# Patient Record
Sex: FEMALE | Race: WHITE | Employment: OTHER | URBAN - NONMETROPOLITAN AREA
[De-identification: names, ages, dates, MRNs, and addresses within clinical notes are randomized per-mention and may not be internally consistent; named-entity substitution may affect disease eponyms.]

---

## 2017-03-29 ENCOUNTER — HOSPITAL ENCOUNTER (OUTPATIENT)
Dept: NON INVASIVE DIAGNOSTICS | Age: 82
Discharge: HOME | End: 2017-03-29
Payer: MEDICARE

## 2017-03-29 PROCEDURE — 93306 TTE W/DOPPLER COMPLETE: CPT

## 2017-12-28 RX ORDER — ALENDRONATE SODIUM 70 MG/1
70 TABLET ORAL
COMMUNITY

## 2017-12-28 RX ORDER — ESOMEPRAZOLE MAGNESIUM 40 MG/1
40 CAPSULE, DELAYED RELEASE ORAL
COMMUNITY

## 2017-12-28 RX ORDER — FUROSEMIDE 20 MG/1
20 TABLET ORAL 2 TIMES DAILY
COMMUNITY

## 2017-12-28 RX ORDER — OXYCODONE HYDROCHLORIDE AND ACETAMINOPHEN 5; 325 MG/1; MG/1
1 TABLET ORAL EVERY 6 HOURS PRN
COMMUNITY

## 2017-12-28 RX ORDER — SUCRALFATE 1 G/1
1 TABLET ORAL 4 TIMES DAILY
COMMUNITY

## 2017-12-28 RX ORDER — BUSPIRONE HYDROCHLORIDE 15 MG/1
15 TABLET ORAL 3 TIMES DAILY
COMMUNITY

## 2017-12-28 RX ORDER — PREDNISONE 1 MG/1
5 TABLET ORAL DAILY
COMMUNITY

## 2017-12-28 RX ORDER — HYDROCHLOROTHIAZIDE 25 MG/1
25 TABLET ORAL DAILY
COMMUNITY

## 2017-12-28 RX ORDER — PENTOXIFYLLINE 400 MG/1
400 TABLET, EXTENDED RELEASE ORAL
COMMUNITY

## 2017-12-28 RX ORDER — LISINOPRIL 5 MG/1
5 TABLET ORAL DAILY
COMMUNITY

## 2017-12-28 RX ORDER — ALBUTEROL SULFATE 90 UG/1
2 AEROSOL, METERED RESPIRATORY (INHALATION) EVERY 4 HOURS PRN
COMMUNITY

## 2018-01-11 ENCOUNTER — OFFICE VISIT (OUTPATIENT)
Dept: GASTROENTEROLOGY | Facility: CLINIC | Age: 83
End: 2018-01-11

## 2018-01-11 VITALS
HEIGHT: 67 IN | TEMPERATURE: 97.2 F | OXYGEN SATURATION: 100 % | SYSTOLIC BLOOD PRESSURE: 124 MMHG | DIASTOLIC BLOOD PRESSURE: 70 MMHG | WEIGHT: 135 LBS | HEART RATE: 67 BPM | BODY MASS INDEX: 21.19 KG/M2

## 2018-01-11 DIAGNOSIS — R13.19 OTHER DYSPHAGIA: Primary | ICD-10-CM

## 2018-01-11 PROCEDURE — 99204 OFFICE O/P NEW MOD 45 MIN: CPT | Performed by: NURSE PRACTITIONER

## 2018-01-11 RX ORDER — FOLIC ACID 1 MG/1
1 TABLET ORAL DAILY
COMMUNITY

## 2018-01-11 RX ORDER — LANOLIN ALCOHOL/MO/W.PET/CERES
1000 CREAM (GRAM) TOPICAL DAILY
COMMUNITY

## 2018-01-11 RX ORDER — METHOCARBAMOL 750 MG/1
750 TABLET, FILM COATED ORAL 4 TIMES DAILY PRN
COMMUNITY

## 2018-01-11 NOTE — PROGRESS NOTES
Chief Complaint   Patient presents with   • Difficulty Swallowing     having problems swallowing off and on meats are the worse       Subjective     HPI     Difficulty swallowing solids and liquids intermittently x 1 yr.  Sx felt in mid esophagus.  Time will relieve sx, no regurgitation.  No N/V.  Increase in indigestion.  She has taken Nexium x 5 yrs and does not think this medication is helping any longer.  She has taken prilosec in the past.  No abdominal pain, no rectal bleeding or melena stools.  Weight is stable.    Past Medical History:   Diagnosis Date   • Hypertension        Past Surgical History:   Procedure Laterality Date   • COLECTOMY TOTAL     • REPLACEMENT TOTAL KNEE BILATERAL      bilateral       Outpatient Prescriptions Marked as Taking for the 1/11/18 encounter (Office Visit) with GALLO Ho   Medication Sig Dispense Refill   • albuterol (PROVENTIL HFA;VENTOLIN HFA) 108 (90 Base) MCG/ACT inhaler Inhale 2 puffs Every 4 (Four) Hours As Needed for Wheezing.     • busPIRone (BUSPAR) 15 MG tablet Take 15 mg by mouth 3 (Three) Times a Day.     • diclofenac (VOLTAREN) 1 % gel gel Apply 4 g topically 4 (Four) Times a Day As Needed.     • esomeprazole (nexIUM) 40 MG capsule Take 40 mg by mouth Every Morning Before Breakfast.     • folic acid (FOLVITE) 1 MG tablet Take 1 mg by mouth Daily.     • furosemide (LASIX) 20 MG tablet Take 20 mg by mouth 2 (Two) Times a Day.     • lisinopril (PRINIVIL,ZESTRIL) 5 MG tablet Take 5 mg by mouth Daily.     • methocarbamol (ROBAXIN) 750 MG tablet Take 750 mg by mouth 4 (Four) Times a Day As Needed for Muscle Spasms.     • oxyCODONE-acetaminophen (PERCOCET) 5-325 MG per tablet Take 1 tablet by mouth Every 6 (Six) Hours As Needed.     • pentoxifylline (TRENtal) 400 MG CR tablet Take 400 mg by mouth 3 (Three) Times a Day With Meals.     • predniSONE (DELTASONE) 5 MG tablet Take 5 mg by mouth Daily.     • sucralfate (CARAFATE) 1 g tablet Take 1 g by mouth 4  "(Four) Times a Day.     • vitamin B-12 (CYANOCOBALAMIN) 1000 MCG tablet Take 1,000 mcg by mouth Daily.         Allergies   Allergen Reactions   • Celebrex [Celecoxib]    • Dilantin [Phenytoin Sodium Extended]    • Erythromycin    • Floxin [Ofloxacin]    • Penicillins        Social History     Social History   • Marital status:      Spouse name: N/A   • Number of children: N/A   • Years of education: N/A     Occupational History   • Not on file.     Social History Main Topics   • Smoking status: Former Smoker   • Smokeless tobacco: Never Used   • Alcohol use No   • Drug use: No   • Sexual activity: Not on file     Other Topics Concern   • Not on file     Social History Narrative       Family History   Problem Relation Age of Onset   • Colon cancer Neg Hx    • Esophageal cancer Neg Hx        Review of Systems   Constitutional: Negative for fatigue, fever and unexpected weight change.   HENT: Negative for hearing loss, sore throat and voice change.    Eyes: Negative for visual disturbance.   Respiratory: Negative for cough, shortness of breath and wheezing.    Cardiovascular: Negative for chest pain and palpitations.   Gastrointestinal: Negative for abdominal pain, blood in stool and vomiting.   Endocrine: Negative for polydipsia and polyuria.   Genitourinary: Negative for difficulty urinating, dysuria, hematuria and urgency.   Musculoskeletal: Negative for joint swelling and myalgias.   Skin: Negative for color change, rash and wound.   Neurological: Negative for dizziness, tremors, seizures and syncope.   Hematological: Does not bruise/bleed easily.   Psychiatric/Behavioral: Negative for agitation and confusion. The patient is not nervous/anxious.        Objective     Vitals:    01/11/18 1009   BP: 124/70   Pulse: 67   Temp: 97.2 °F (36.2 °C)   SpO2: 100%   Weight: 61.2 kg (135 lb)   Height: 170.2 cm (67\")     Body mass index is 21.14 kg/(m^2).    Physical Exam   Constitutional: She is oriented to person, " place, and time. She appears well-developed and well-nourished.   HENT:   Head: Normocephalic and atraumatic.   Eyes: Conjunctivae are normal. Pupils are equal, round, and reactive to light. No scleral icterus.   Neck: No JVD present. No thyroid mass and no thyromegaly present.   Cardiovascular: Normal rate, regular rhythm and normal heart sounds.  Exam reveals no gallop and no friction rub.    No murmur heard.  Pulmonary/Chest: Effort normal and breath sounds normal. No accessory muscle usage. No respiratory distress. She has no wheezes. She has no rales.   Abdominal: Soft. Bowel sounds are normal. She exhibits no distension, no ascites and no mass. There is no splenomegaly or hepatomegaly. There is no tenderness. There is no rebound and no guarding.   Genitourinary:   Genitourinary Comments: Rectal-Did not examine   Musculoskeletal: Normal range of motion. She exhibits no edema.   Neurological: She is alert and oriented to person, place, and time.   Deemed a reliable historian, able to converse without difficulty and able to move all extremities without difficulty   Skin: Skin is warm and dry.   Psychiatric: She has a normal mood and affect. Her behavior is normal.       Imaging Results (most recent)     None          Assessment/Plan     Cassandra was seen today for difficulty swallowing.    Diagnoses and all orders for this visit:    Other dysphagia  -     FL Esophagram Complete    Further orders pending result of esophagram  Pt would like to avoid EGD if possible  EGD v medication change  * Surgery not found *    There are no Patient Instructions on file for this visit.

## 2018-01-19 ENCOUNTER — HOSPITAL ENCOUNTER (OUTPATIENT)
Dept: GENERAL RADIOLOGY | Facility: HOSPITAL | Age: 83
Discharge: HOME | End: 2018-01-19
Admitting: NURSE PRACTITIONER

## 2018-01-19 PROCEDURE — 74220 X-RAY XM ESOPHAGUS 1CNTRST: CPT

## 2018-01-19 RX ADMIN — BARIUM SULFATE 60 ML: 960 POWDER, FOR SUSPENSION ORAL at 09:56

## 2018-01-22 ENCOUNTER — TELEPHONE (OUTPATIENT)
Dept: GASTROENTEROLOGY | Facility: CLINIC | Age: 83
End: 2018-01-22

## 2018-01-22 NOTE — TELEPHONE ENCOUNTER
Esophagram reviewed  Unable to reach pt by phone, not able to leave message  Test discussed with Dr Edward  Will recommend trial PPI

## 2018-02-06 ENCOUNTER — TELEPHONE (OUTPATIENT)
Dept: GASTROENTEROLOGY | Facility: CLINIC | Age: 83
End: 2018-02-06

## 2018-05-09 ENCOUNTER — HOSPITAL ENCOUNTER (OUTPATIENT)
Dept: NON INVASIVE DIAGNOSTICS | Age: 83
Discharge: HOME | End: 2018-05-09
Payer: MEDICARE

## 2018-05-09 LAB
LV EF: 60 %
LVEF MODALITY: NORMAL

## 2018-05-09 PROCEDURE — 93306 TTE W/DOPPLER COMPLETE: CPT

## 2018-07-19 LAB
BACTERIA: ABNORMAL /HPF
BILIRUBIN URINE: NEGATIVE
BLOOD, URINE: NEGATIVE
CLARITY: CLEAR
COLOR: YELLOW
EPITHELIAL CELLS, UA: 0 /HPF (ref 0–5)
GLUCOSE URINE: NEGATIVE MG/DL
HYALINE CASTS: 3 /HPF (ref 0–8)
KETONES, URINE: NEGATIVE MG/DL
LEUKOCYTE ESTERASE, URINE: ABNORMAL
NITRITE, URINE: NEGATIVE
PH UA: 5.5
PROTEIN UA: NEGATIVE MG/DL
RBC UA: 1 /HPF (ref 0–4)
SPECIFIC GRAVITY UA: 1.01
URINE REFLEX TO CULTURE: YES
UROBILINOGEN, URINE: 0.2 E.U./DL
WBC UA: 20 /HPF (ref 0–5)

## 2018-07-21 LAB
ORGANISM: ABNORMAL
URINE CULTURE, ROUTINE: ABNORMAL
URINE CULTURE, ROUTINE: ABNORMAL

## 2018-07-23 LAB
ANION GAP SERPL CALCULATED.3IONS-SCNC: 14 MMOL/L (ref 7–19)
BASOPHILS ABSOLUTE: 0 K/UL (ref 0–0.2)
BASOPHILS RELATIVE PERCENT: 0.1 % (ref 0–1)
BUN BLDV-MCNC: 34 MG/DL (ref 8–23)
CALCIUM SERPL-MCNC: 9 MG/DL (ref 8.2–9.6)
CHLORIDE BLD-SCNC: 106 MMOL/L (ref 98–111)
CO2: 23 MMOL/L (ref 22–29)
CREAT SERPL-MCNC: 1.3 MG/DL (ref 0.5–0.9)
EOSINOPHILS ABSOLUTE: 0 K/UL (ref 0–0.6)
EOSINOPHILS RELATIVE PERCENT: 0.3 % (ref 0–5)
GFR NON-AFRICAN AMERICAN: 38
GLUCOSE BLD-MCNC: 81 MG/DL (ref 74–109)
HCT VFR BLD CALC: 29.2 % (ref 37–47)
HEMOGLOBIN: 9.1 G/DL (ref 12–16)
LYMPHOCYTES ABSOLUTE: 1.1 K/UL (ref 1.1–4.5)
LYMPHOCYTES RELATIVE PERCENT: 10.3 % (ref 20–40)
MCH RBC QN AUTO: 31.1 PG (ref 27–31)
MCHC RBC AUTO-ENTMCNC: 31.2 G/DL (ref 33–37)
MCV RBC AUTO: 99.7 FL (ref 81–99)
MONOCYTES ABSOLUTE: 1.2 K/UL (ref 0–0.9)
MONOCYTES RELATIVE PERCENT: 11.9 % (ref 0–10)
NEUTROPHILS ABSOLUTE: 7.8 K/UL (ref 1.5–7.5)
NEUTROPHILS RELATIVE PERCENT: 76.7 % (ref 50–65)
PDW BLD-RTO: 15.5 % (ref 11.5–14.5)
PLATELET # BLD: 346 K/UL (ref 130–400)
PMV BLD AUTO: 11.1 FL (ref 9.4–12.3)
POTASSIUM SERPL-SCNC: 3.5 MMOL/L (ref 3.5–5)
RBC # BLD: 2.93 M/UL (ref 4.2–5.4)
SODIUM BLD-SCNC: 143 MMOL/L (ref 136–145)
WBC # BLD: 10.2 K/UL (ref 4.8–10.8)

## 2018-08-02 ENCOUNTER — OFFICE VISIT (OUTPATIENT)
Dept: CARDIOLOGY | Age: 83
End: 2018-08-02
Payer: MEDICARE

## 2018-08-02 VITALS
HEART RATE: 94 BPM | WEIGHT: 148 LBS | DIASTOLIC BLOOD PRESSURE: 88 MMHG | HEIGHT: 67 IN | SYSTOLIC BLOOD PRESSURE: 140 MMHG | BODY MASS INDEX: 23.23 KG/M2

## 2018-08-02 DIAGNOSIS — I35.0 NONRHEUMATIC AORTIC VALVE STENOSIS: ICD-10-CM

## 2018-08-02 DIAGNOSIS — I35.0 AORTIC VALVE STENOSIS, ETIOLOGY OF CARDIAC VALVE DISEASE UNSPECIFIED: Primary | ICD-10-CM

## 2018-08-02 DIAGNOSIS — I10 ESSENTIAL HYPERTENSION: ICD-10-CM

## 2018-08-02 PROCEDURE — 1101F PT FALLS ASSESS-DOCD LE1/YR: CPT | Performed by: INTERNAL MEDICINE

## 2018-08-02 PROCEDURE — G8427 DOCREV CUR MEDS BY ELIG CLIN: HCPCS | Performed by: INTERNAL MEDICINE

## 2018-08-02 PROCEDURE — 4040F PNEUMOC VAC/ADMIN/RCVD: CPT | Performed by: INTERNAL MEDICINE

## 2018-08-02 PROCEDURE — 1123F ACP DISCUSS/DSCN MKR DOCD: CPT | Performed by: INTERNAL MEDICINE

## 2018-08-02 PROCEDURE — G8420 CALC BMI NORM PARAMETERS: HCPCS | Performed by: INTERNAL MEDICINE

## 2018-08-02 PROCEDURE — 99202 OFFICE O/P NEW SF 15 MIN: CPT | Performed by: INTERNAL MEDICINE

## 2018-08-02 PROCEDURE — 1036F TOBACCO NON-USER: CPT | Performed by: INTERNAL MEDICINE

## 2018-08-02 PROCEDURE — 93000 ELECTROCARDIOGRAM COMPLETE: CPT | Performed by: INTERNAL MEDICINE

## 2018-08-02 PROCEDURE — 1090F PRES/ABSN URINE INCON ASSESS: CPT | Performed by: INTERNAL MEDICINE

## 2018-08-02 RX ORDER — PREDNISONE 1 MG/1
5 TABLET ORAL DAILY PRN
Status: ON HOLD | COMMUNITY
End: 2020-01-01 | Stop reason: HOSPADM

## 2018-08-02 RX ORDER — SENNA PLUS 8.6 MG/1
2 TABLET ORAL DAILY
COMMUNITY
End: 2020-01-01

## 2018-08-02 RX ORDER — HYDROXYZINE HYDROCHLORIDE 10 MG/1
10 TABLET, FILM COATED ORAL 3 TIMES DAILY PRN
Status: ON HOLD | COMMUNITY
End: 2020-01-01 | Stop reason: HOSPADM

## 2018-08-02 RX ORDER — FUROSEMIDE 20 MG/1
20 TABLET ORAL DAILY
Status: ON HOLD | COMMUNITY
End: 2019-01-16 | Stop reason: HOSPADM

## 2018-08-02 RX ORDER — OXYCODONE AND ACETAMINOPHEN 10; 325 MG/1; MG/1
1 TABLET ORAL EVERY 6 HOURS PRN
Status: ON HOLD | COMMUNITY
End: 2020-01-01 | Stop reason: HOSPADM

## 2018-08-02 RX ORDER — BUSPIRONE HYDROCHLORIDE 15 MG/1
15 TABLET ORAL 2 TIMES DAILY
COMMUNITY

## 2018-08-02 RX ORDER — LISINOPRIL 5 MG/1
5 TABLET ORAL DAILY
COMMUNITY
End: 2018-08-02 | Stop reason: DRUGHIGH

## 2018-08-02 RX ORDER — LIDOCAINE 50 MG/G
OINTMENT TOPICAL PRN
COMMUNITY
End: 2020-01-01

## 2018-08-02 RX ORDER — LISINOPRIL 10 MG/1
10 TABLET ORAL DAILY
Qty: 30 TABLET | Refills: 5 | Status: ON HOLD | OUTPATIENT
Start: 2018-08-02 | End: 2019-01-16 | Stop reason: HOSPADM

## 2018-08-02 RX ORDER — GUAIFENESIN 400 MG/1
600 TABLET ORAL 2 TIMES DAILY
COMMUNITY
End: 2020-01-01

## 2018-08-02 ASSESSMENT — ENCOUNTER SYMPTOMS
BACK PAIN: 1
WHEEZING: 0
CHOKING: 0
NAUSEA: 0
SHORTNESS OF BREATH: 1
BLOOD IN STOOL: 0
CHEST TIGHTNESS: 0
ABDOMINAL DISTENTION: 0
COUGH: 0
APNEA: 0

## 2018-08-02 NOTE — PROGRESS NOTES
MRN: [id number] Todays Date: 2018  Last Appointment: Visit date not found  Davi Youssef is a 80 y.o. patient  : 1920  Referring Provider: Anita Zamora MD  PCP: Alli Roy MD    History of Present Illness:   81 y/o lady with numerous medical complaints referred for evaluation because of abnormal ECHO. Study revealed normal LV systolic function with LVH and severe aortic stenosis - aortic valve area 0.7 CM2 by continuity equation. Complaints include fatigue, dyspnea, pain from many sites (arthritis), weak leg that often fails to support her, and multiple bruises. In summary - fairly miserable with the summed problems. No chest pain or syncope. Risk Factors:  Family History   Problem Relation Age of Onset    Other Mother          from diverticulitis    Stroke Father     Cancer Other         siblings     HTN: uncontrolled      Diabetes: No  none    Past Medical History:   Diagnosis Date    GERD (gastroesophageal reflux disease)     Osteoporosis     Rheumatoid arthritis(714.0)     Swelling     bilateral lower extremity     Allergies   Allergen Reactions    Penicillins     Sulfa Antibiotics      Past Surgical History:   Procedure Laterality Date    BRAIN ANEURYSM SURGERY      COLECTOMY      EYE SURGERY      bilateral    OTHER SURGICAL HISTORY      eye injections per Dr. Ben Santos, [facility]    SPLENECTOMY      TOTAL KNEE ARTHROPLASTY      bilatera       Family History   Problem Relation Age of Onset    Other Mother          from diverticulitis    Stroke Father     Cancer Other         siblings       Social History     Social History    Marital status:       Spouse name: N/A    Number of children: N/A    Years of education: N/A     Social History Main Topics    Smoking status: Former Smoker    Smokeless tobacco: Never Used    Alcohol use No    Drug use: No    Sexual activity: Not Asked     Other Topics Concern    None     Social History Narrative    None every 6 hours as needed. , Disp: , Rfl:     Review of Systems   Constitutional: Positive for activity change (leg weakness and arthritis prevent activity) and fatigue. Negative for diaphoresis and unexpected weight change. Poor quality of life due to pain and weakness   HENT: Positive for hearing loss. Negative for congestion and tinnitus. Eyes: Negative for visual disturbance. Respiratory: Positive for shortness of breath. Negative for apnea, cough, choking, chest tightness and wheezing. Cardiovascular: Positive for leg swelling. Negative for chest pain and palpitations. ECHO - Severe AS  VERNON 0.7 CM2   Gastrointestinal: Negative for abdominal distention, blood in stool and nausea. Endocrine: Negative for cold intolerance and heat intolerance. Genitourinary: Negative for difficulty urinating. Musculoskeletal: Positive for back pain. Negative for arthralgias and myalgias. Severe arthritis - constant pain  Leg weakness - can hardly walk   Skin: Negative for rash. Neurological: Positive for dizziness, weakness and numbness. Negative for tremors, seizures, syncope, speech difficulty, light-headedness and headaches. Hematological: Bruises/bleeds easily. Psychiatric/Behavioral: Negative for agitation, confusion, dysphoric mood and sleep disturbance. The patient is not nervous/anxious. BP (!) 140/88   Pulse 94   Ht 5' 7\" (1.702 m)   Wt 148 lb (67.1 kg)   BMI 23.18 kg/m²   Physical Exam   Constitutional: She is oriented to person, place, and time. No distress. Frail elderly lady who had much difficulty standing   HENT:   Head: Normocephalic. Bitemporal wasting   Eyes: Conjunctivae and EOM are normal. Pupils are equal, round, and reactive to light. Neck: No JVD present. Carotid bruit is not present. No thyromegaly present. Cardiovascular: PMI is not displaced. Exam reveals no gallop and no friction rub. No murmur heard.   Irregular rhythm  Coarse 3/6 systolic

## 2018-08-02 NOTE — PATIENT INSTRUCTIONS
Learning About Transcatheter Aortic Valve Replacement (TAVR)  What is TAVR? Transcatheter aortic valve replacement (TAVR) is a procedure that replaces the aortic heart valve. It is done to treat aortic valve stenosis. In aortic valve stenosis, the valve between your heart and the large blood vessel that carries blood to the body (aorta) has narrowed. That forces the heart to pump harder to get enough blood through the valve. In TAVR, the doctor uses a catheter to put in the new heart valve. Open-heart surgery is not done. TAVR is a newer procedure. How well it works long-term is not known yet. And TAVR can cause serious problems. These include stroke, a heart attack during the procedure, or even death. TAVR may be a good option for a person who cannot have surgery or for a person who has a high risk of serious problems from open-heart surgery. For example, you might think about TAVR if you are not healthy enough for an open-heart surgery. TAVR may not be a good choice if an open-heart surgery is likely to be successful. A team of doctors will use professional guidelines to decide whether or not TAVR is a good choice for you. How is TAVR done? TAVR is often done through an incision (cut) in the groin. But sometimes a small cut is made in the chest. The doctor uses a tube called a catheter and special tools that fit inside the catheter. The doctor puts the catheter into a blood vessel and moves it through the blood vessel and into the heart. A specially designed artificial valve fits inside the catheter. The doctor then moves the new valve into the damaged aortic valve. The artificial valve expands and takes the place of the damaged aortic valve. Most people will be asleep for the procedure. The surgery usually takes about 2 to 3 hours. You will have to stay in the hospital for several days after the procedure. What can you expect after TAVR?   · While you are in the hospital, your doctors and nurses will monitor you to check how the new valve is working. · You will receive information from the hospital about diet, activities, and medicine. · You will need to have regular checkups with your doctor. · When you leave the hospital, your doctor may give you a blood thinner for a few months to prevent blood clots. If you get a blood thinner, be sure you get instructions about how to take your medicine safely. Blood thinners can cause serious bleeding problems. Follow-up care is a key part of your treatment and safety. Be sure to make and go to all appointments, and call your doctor if you are having problems. It's also a good idea to know your test results and keep a list of the medicines you take.

## 2018-09-20 ENCOUNTER — OFFICE VISIT (OUTPATIENT)
Dept: CARDIOLOGY | Age: 83
End: 2018-09-20
Payer: MEDICARE

## 2018-09-20 VITALS
HEART RATE: 87 BPM | DIASTOLIC BLOOD PRESSURE: 72 MMHG | BODY MASS INDEX: 22.91 KG/M2 | HEIGHT: 67 IN | WEIGHT: 146 LBS | SYSTOLIC BLOOD PRESSURE: 124 MMHG

## 2018-09-20 DIAGNOSIS — I10 ESSENTIAL HYPERTENSION: Primary | ICD-10-CM

## 2018-09-20 PROCEDURE — G8427 DOCREV CUR MEDS BY ELIG CLIN: HCPCS | Performed by: INTERNAL MEDICINE

## 2018-09-20 PROCEDURE — 1090F PRES/ABSN URINE INCON ASSESS: CPT | Performed by: INTERNAL MEDICINE

## 2018-09-20 PROCEDURE — 4040F PNEUMOC VAC/ADMIN/RCVD: CPT | Performed by: INTERNAL MEDICINE

## 2018-09-20 PROCEDURE — 99212 OFFICE O/P EST SF 10 MIN: CPT | Performed by: INTERNAL MEDICINE

## 2018-09-20 PROCEDURE — 1123F ACP DISCUSS/DSCN MKR DOCD: CPT | Performed by: INTERNAL MEDICINE

## 2018-09-20 PROCEDURE — G8420 CALC BMI NORM PARAMETERS: HCPCS | Performed by: INTERNAL MEDICINE

## 2018-09-20 PROCEDURE — 1036F TOBACCO NON-USER: CPT | Performed by: INTERNAL MEDICINE

## 2018-09-20 PROCEDURE — 1101F PT FALLS ASSESS-DOCD LE1/YR: CPT | Performed by: INTERNAL MEDICINE

## 2019-01-01 ENCOUNTER — TELEPHONE (OUTPATIENT)
Dept: CARDIOLOGY | Age: 84
End: 2019-01-01

## 2019-01-01 ENCOUNTER — OFFICE VISIT (OUTPATIENT)
Dept: CARDIOLOGY | Age: 84
End: 2019-01-01
Payer: MEDICARE

## 2019-01-01 VITALS
BODY MASS INDEX: 20.4 KG/M2 | SYSTOLIC BLOOD PRESSURE: 160 MMHG | WEIGHT: 130 LBS | HEIGHT: 67 IN | DIASTOLIC BLOOD PRESSURE: 80 MMHG | HEART RATE: 70 BPM

## 2019-01-01 DIAGNOSIS — I10 ESSENTIAL HYPERTENSION: Primary | ICD-10-CM

## 2019-01-01 PROCEDURE — 93000 ELECTROCARDIOGRAM COMPLETE: CPT | Performed by: INTERNAL MEDICINE

## 2019-01-01 PROCEDURE — 1036F TOBACCO NON-USER: CPT | Performed by: INTERNAL MEDICINE

## 2019-01-01 PROCEDURE — G8427 DOCREV CUR MEDS BY ELIG CLIN: HCPCS | Performed by: INTERNAL MEDICINE

## 2019-01-01 PROCEDURE — 99212 OFFICE O/P EST SF 10 MIN: CPT | Performed by: INTERNAL MEDICINE

## 2019-01-01 PROCEDURE — 1090F PRES/ABSN URINE INCON ASSESS: CPT | Performed by: INTERNAL MEDICINE

## 2019-01-01 PROCEDURE — G8420 CALC BMI NORM PARAMETERS: HCPCS | Performed by: INTERNAL MEDICINE

## 2019-01-01 PROCEDURE — 4040F PNEUMOC VAC/ADMIN/RCVD: CPT | Performed by: INTERNAL MEDICINE

## 2019-01-01 PROCEDURE — 1123F ACP DISCUSS/DSCN MKR DOCD: CPT | Performed by: INTERNAL MEDICINE

## 2019-01-01 RX ORDER — CLONIDINE HYDROCHLORIDE 0.1 MG/1
0.1 TABLET ORAL NIGHTLY
Qty: 90 TABLET | Refills: 3 | Status: ON HOLD | OUTPATIENT
Start: 2019-01-01 | End: 2020-01-01 | Stop reason: HOSPADM

## 2019-01-10 ENCOUNTER — HOSPITAL ENCOUNTER (INPATIENT)
Age: 84
LOS: 6 days | Discharge: HOME HEALTH | DRG: 292 | End: 2019-01-16
Attending: FAMILY MEDICINE | Admitting: FAMILY MEDICINE
Payer: MEDICARE

## 2019-01-10 ENCOUNTER — APPOINTMENT (OUTPATIENT)
Dept: GENERAL RADIOLOGY | Age: 84
DRG: 292 | End: 2019-01-10
Attending: FAMILY MEDICINE
Payer: MEDICARE

## 2019-01-10 PROBLEM — E87.70 VOLUME OVERLOAD: Status: ACTIVE | Noted: 2019-01-10

## 2019-01-10 LAB
ALBUMIN SERPL-MCNC: 3.6 G/DL (ref 3.5–5.2)
ALP BLD-CCNC: 53 U/L (ref 35–104)
ALT SERPL-CCNC: <5 U/L (ref 5–33)
ANION GAP SERPL CALCULATED.3IONS-SCNC: 15 MMOL/L (ref 7–19)
AST SERPL-CCNC: 13 U/L (ref 5–32)
BASOPHILS ABSOLUTE: 0 K/UL (ref 0–0.2)
BASOPHILS RELATIVE PERCENT: 0.3 % (ref 0–1)
BILIRUB SERPL-MCNC: 0.4 MG/DL (ref 0.2–1.2)
BUN BLDV-MCNC: 23 MG/DL (ref 8–23)
CALCIUM SERPL-MCNC: 9.8 MG/DL (ref 8.2–9.6)
CHLORIDE BLD-SCNC: 101 MMOL/L (ref 98–111)
CO2: 26 MMOL/L (ref 22–29)
CREAT SERPL-MCNC: 0.8 MG/DL (ref 0.5–0.9)
EOSINOPHILS ABSOLUTE: 0 K/UL (ref 0–0.6)
EOSINOPHILS RELATIVE PERCENT: 0 % (ref 0–5)
GFR NON-AFRICAN AMERICAN: >60
GLUCOSE BLD-MCNC: 104 MG/DL (ref 74–109)
HCT VFR BLD CALC: 29.8 % (ref 37–47)
HEMOGLOBIN: 9.4 G/DL (ref 12–16)
LYMPHOCYTES ABSOLUTE: 0.4 K/UL (ref 1.1–4.5)
LYMPHOCYTES RELATIVE PERCENT: 10.5 % (ref 20–40)
MCH RBC QN AUTO: 30.8 PG (ref 27–31)
MCHC RBC AUTO-ENTMCNC: 31.5 G/DL (ref 33–37)
MCV RBC AUTO: 97.7 FL (ref 81–99)
MONOCYTES ABSOLUTE: 0.3 K/UL (ref 0–0.9)
MONOCYTES RELATIVE PERCENT: 6.4 % (ref 0–10)
NEUTROPHILS ABSOLUTE: 3.2 K/UL (ref 1.5–7.5)
NEUTROPHILS RELATIVE PERCENT: 82.3 % (ref 50–65)
PDW BLD-RTO: 15.5 % (ref 11.5–14.5)
PLATELET # BLD: 308 K/UL (ref 130–400)
PMV BLD AUTO: 10.1 FL (ref 9.4–12.3)
POTASSIUM SERPL-SCNC: 4.5 MMOL/L (ref 3.5–5)
PRO-BNP: ABNORMAL PG/ML (ref 0–1800)
RBC # BLD: 3.05 M/UL (ref 4.2–5.4)
SODIUM BLD-SCNC: 142 MMOL/L (ref 136–145)
TOTAL PROTEIN: 6.9 G/DL (ref 6.6–8.7)
WBC # BLD: 3.9 K/UL (ref 4.8–10.8)

## 2019-01-10 PROCEDURE — 80053 COMPREHEN METABOLIC PANEL: CPT

## 2019-01-10 PROCEDURE — 2140000000 HC CCU INTERMEDIATE R&B

## 2019-01-10 PROCEDURE — 6360000002 HC RX W HCPCS: Performed by: FAMILY MEDICINE

## 2019-01-10 PROCEDURE — 85025 COMPLETE CBC W/AUTO DIFF WBC: CPT

## 2019-01-10 PROCEDURE — 36415 COLL VENOUS BLD VENIPUNCTURE: CPT

## 2019-01-10 PROCEDURE — 71046 X-RAY EXAM CHEST 2 VIEWS: CPT

## 2019-01-10 PROCEDURE — 6370000000 HC RX 637 (ALT 250 FOR IP): Performed by: FAMILY MEDICINE

## 2019-01-10 PROCEDURE — 83880 ASSAY OF NATRIURETIC PEPTIDE: CPT

## 2019-01-10 RX ORDER — LANOLIN ALCOHOL/MO/W.PET/CERES
800 CREAM (GRAM) TOPICAL DAILY
Status: DISCONTINUED | OUTPATIENT
Start: 2019-01-10 | End: 2019-01-16 | Stop reason: HOSPADM

## 2019-01-10 RX ORDER — HYDROXYZINE HYDROCHLORIDE 10 MG/1
10 TABLET, FILM COATED ORAL 3 TIMES DAILY PRN
Status: DISCONTINUED | OUTPATIENT
Start: 2019-01-10 | End: 2019-01-16 | Stop reason: HOSPADM

## 2019-01-10 RX ORDER — LISINOPRIL 20 MG/1
20 TABLET ORAL DAILY
Status: DISCONTINUED | OUTPATIENT
Start: 2019-01-10 | End: 2019-01-12

## 2019-01-10 RX ORDER — CYCLOBENZAPRINE HCL 10 MG
5 TABLET ORAL 3 TIMES DAILY PRN
Status: DISCONTINUED | OUTPATIENT
Start: 2019-01-10 | End: 2019-01-16 | Stop reason: HOSPADM

## 2019-01-10 RX ORDER — SENNA PLUS 8.6 MG/1
2 TABLET ORAL DAILY
Status: DISCONTINUED | OUTPATIENT
Start: 2019-01-10 | End: 2019-01-16 | Stop reason: HOSPADM

## 2019-01-10 RX ORDER — OXYCODONE AND ACETAMINOPHEN 10; 325 MG/1; MG/1
1 TABLET ORAL EVERY 4 HOURS PRN
Status: DISCONTINUED | OUTPATIENT
Start: 2019-01-10 | End: 2019-01-16 | Stop reason: HOSPADM

## 2019-01-10 RX ORDER — PANTOPRAZOLE SODIUM 40 MG/1
40 TABLET, DELAYED RELEASE ORAL 2 TIMES DAILY
Status: DISCONTINUED | OUTPATIENT
Start: 2019-01-10 | End: 2019-01-16 | Stop reason: HOSPADM

## 2019-01-10 RX ORDER — ASPIRIN 81 MG/1
81 TABLET, CHEWABLE ORAL DAILY
Status: DISCONTINUED | OUTPATIENT
Start: 2019-01-10 | End: 2019-01-16 | Stop reason: HOSPADM

## 2019-01-10 RX ORDER — CHOLECALCIFEROL (VITAMIN D3) 125 MCG
5000 CAPSULE ORAL DAILY
Status: DISCONTINUED | OUTPATIENT
Start: 2019-01-11 | End: 2019-01-16 | Stop reason: HOSPADM

## 2019-01-10 RX ORDER — FUROSEMIDE 10 MG/ML
40 INJECTION INTRAMUSCULAR; INTRAVENOUS 2 TIMES DAILY
Status: DISCONTINUED | OUTPATIENT
Start: 2019-01-10 | End: 2019-01-13

## 2019-01-10 RX ORDER — SUCRALFATE 1 G/1
1 TABLET ORAL 4 TIMES DAILY
Status: DISCONTINUED | OUTPATIENT
Start: 2019-01-10 | End: 2019-01-16 | Stop reason: HOSPADM

## 2019-01-10 RX ORDER — PENTOXIFYLLINE 400 MG/1
400 TABLET, EXTENDED RELEASE ORAL
Status: DISCONTINUED | OUTPATIENT
Start: 2019-01-10 | End: 2019-01-16 | Stop reason: HOSPADM

## 2019-01-10 RX ORDER — GUAIFENESIN 200 MG/1
400 TABLET ORAL 4 TIMES DAILY PRN
Status: DISCONTINUED | OUTPATIENT
Start: 2019-01-10 | End: 2019-01-16 | Stop reason: HOSPADM

## 2019-01-10 RX ORDER — BUSPIRONE HYDROCHLORIDE 5 MG/1
15 TABLET ORAL 2 TIMES DAILY
Status: DISCONTINUED | OUTPATIENT
Start: 2019-01-10 | End: 2019-01-16 | Stop reason: HOSPADM

## 2019-01-10 RX ORDER — ALBUTEROL SULFATE 90 UG/1
2 AEROSOL, METERED RESPIRATORY (INHALATION) EVERY 6 HOURS PRN
Status: DISCONTINUED | OUTPATIENT
Start: 2019-01-10 | End: 2019-01-16 | Stop reason: HOSPADM

## 2019-01-10 RX ORDER — CYCLOBENZAPRINE HCL 5 MG
5 TABLET ORAL 3 TIMES DAILY PRN
Status: ON HOLD | COMMUNITY
End: 2020-01-01 | Stop reason: HOSPADM

## 2019-01-10 RX ADMIN — FUROSEMIDE 40 MG: 10 INJECTION, SOLUTION INTRAMUSCULAR; INTRAVENOUS at 21:36

## 2019-01-10 RX ADMIN — SUCRALFATE 1 G: 1 TABLET ORAL at 20:14

## 2019-01-10 RX ADMIN — BUSPIRONE HYDROCHLORIDE 15 MG: 5 TABLET ORAL at 20:13

## 2019-01-10 RX ADMIN — OXYCODONE HYDROCHLORIDE AND ACETAMINOPHEN 1 TABLET: 10; 325 TABLET ORAL at 19:42

## 2019-01-10 RX ADMIN — SENNOSIDES 17.2 MG: 8.6 TABLET, FILM COATED ORAL at 19:35

## 2019-01-10 RX ADMIN — LISINOPRIL 20 MG: 20 TABLET ORAL at 19:36

## 2019-01-10 RX ADMIN — PANTOPRAZOLE SODIUM 40 MG: 40 TABLET, DELAYED RELEASE ORAL at 20:13

## 2019-01-10 RX ADMIN — FOLIC ACID TAB 400 MCG 800 MCG: 400 TAB at 19:36

## 2019-01-10 RX ADMIN — DICLOFENAC 2 G: 10 GEL TOPICAL at 20:12

## 2019-01-10 RX ADMIN — ASPIRIN 81 MG CHEWABLE TABLET 81 MG: 81 TABLET CHEWABLE at 19:36

## 2019-01-10 RX ADMIN — PENTOXIFYLLINE 400 MG: 400 TABLET, EXTENDED RELEASE ORAL at 19:35

## 2019-01-10 ASSESSMENT — PAIN SCALES - GENERAL
PAINLEVEL_OUTOF10: 8
PAINLEVEL_OUTOF10: 0
PAINLEVEL_OUTOF10: 7

## 2019-01-11 PROBLEM — Z51.5 PALLIATIVE CARE PATIENT: Status: ACTIVE | Noted: 2019-01-11

## 2019-01-11 LAB
ANION GAP SERPL CALCULATED.3IONS-SCNC: 10 MMOL/L (ref 7–19)
BUN BLDV-MCNC: 23 MG/DL (ref 8–23)
CALCIUM SERPL-MCNC: 9.5 MG/DL (ref 8.2–9.6)
CHLORIDE BLD-SCNC: 101 MMOL/L (ref 98–111)
CO2: 28 MMOL/L (ref 22–29)
CREAT SERPL-MCNC: 0.8 MG/DL (ref 0.5–0.9)
GFR NON-AFRICAN AMERICAN: >60
GLUCOSE BLD-MCNC: 106 MG/DL (ref 74–109)
POTASSIUM SERPL-SCNC: 4.2 MMOL/L (ref 3.5–5)
SODIUM BLD-SCNC: 139 MMOL/L (ref 136–145)

## 2019-01-11 PROCEDURE — 99222 1ST HOSP IP/OBS MODERATE 55: CPT | Performed by: INTERNAL MEDICINE

## 2019-01-11 PROCEDURE — 6370000000 HC RX 637 (ALT 250 FOR IP): Performed by: FAMILY MEDICINE

## 2019-01-11 PROCEDURE — 2140000000 HC CCU INTERMEDIATE R&B

## 2019-01-11 PROCEDURE — 36415 COLL VENOUS BLD VENIPUNCTURE: CPT

## 2019-01-11 PROCEDURE — 80048 BASIC METABOLIC PNL TOTAL CA: CPT

## 2019-01-11 PROCEDURE — 6360000002 HC RX W HCPCS: Performed by: FAMILY MEDICINE

## 2019-01-11 RX ORDER — CLONIDINE HYDROCHLORIDE 0.1 MG/1
0.1 TABLET ORAL EVERY 4 HOURS PRN
Status: DISCONTINUED | OUTPATIENT
Start: 2019-01-11 | End: 2019-01-16 | Stop reason: HOSPADM

## 2019-01-11 RX ORDER — CLONIDINE HYDROCHLORIDE 0.1 MG/1
0.1 TABLET ORAL 2 TIMES DAILY
Status: DISCONTINUED | OUTPATIENT
Start: 2019-01-11 | End: 2019-01-11

## 2019-01-11 RX ADMIN — SUCRALFATE 1 G: 1 TABLET ORAL at 18:42

## 2019-01-11 RX ADMIN — PENTOXIFYLLINE 400 MG: 400 TABLET, EXTENDED RELEASE ORAL at 08:59

## 2019-01-11 RX ADMIN — FUROSEMIDE 40 MG: 10 INJECTION, SOLUTION INTRAMUSCULAR; INTRAVENOUS at 08:58

## 2019-01-11 RX ADMIN — SUCRALFATE 1 G: 1 TABLET ORAL at 08:59

## 2019-01-11 RX ADMIN — DICLOFENAC 2 G: 10 GEL TOPICAL at 08:57

## 2019-01-11 RX ADMIN — ALBUTEROL SULFATE 2 PUFF: 90 AEROSOL, METERED RESPIRATORY (INHALATION) at 08:23

## 2019-01-11 RX ADMIN — CLONIDINE HYDROCHLORIDE 0.1 MG: 0.1 TABLET ORAL at 03:26

## 2019-01-11 RX ADMIN — OXYCODONE HYDROCHLORIDE AND ACETAMINOPHEN 1 TABLET: 10; 325 TABLET ORAL at 10:56

## 2019-01-11 RX ADMIN — BUSPIRONE HYDROCHLORIDE 15 MG: 5 TABLET ORAL at 08:57

## 2019-01-11 RX ADMIN — OXYCODONE HYDROCHLORIDE AND ACETAMINOPHEN 1 TABLET: 10; 325 TABLET ORAL at 00:08

## 2019-01-11 RX ADMIN — ALBUTEROL SULFATE 2 PUFF: 90 AEROSOL, METERED RESPIRATORY (INHALATION) at 20:13

## 2019-01-11 RX ADMIN — FOLIC ACID TAB 400 MCG 800 MCG: 400 TAB at 08:57

## 2019-01-11 RX ADMIN — ASPIRIN 81 MG CHEWABLE TABLET 81 MG: 81 TABLET CHEWABLE at 18:41

## 2019-01-11 RX ADMIN — DICLOFENAC 2 G: 10 GEL TOPICAL at 20:11

## 2019-01-11 RX ADMIN — SUCRALFATE 1 G: 1 TABLET ORAL at 20:10

## 2019-01-11 RX ADMIN — SENNOSIDES 17.2 MG: 8.6 TABLET, FILM COATED ORAL at 08:59

## 2019-01-11 RX ADMIN — SUCRALFATE 1 G: 1 TABLET ORAL at 12:34

## 2019-01-11 RX ADMIN — PENTOXIFYLLINE 400 MG: 400 TABLET, EXTENDED RELEASE ORAL at 12:34

## 2019-01-11 RX ADMIN — OXYCODONE HYDROCHLORIDE AND ACETAMINOPHEN 1 TABLET: 10; 325 TABLET ORAL at 04:11

## 2019-01-11 RX ADMIN — PENTOXIFYLLINE 400 MG: 400 TABLET, EXTENDED RELEASE ORAL at 18:42

## 2019-01-11 RX ADMIN — CHOLECALCIFEROL CAP 125 MCG (5000 UNIT) 5000 UNITS: 125 CAP at 08:58

## 2019-01-11 RX ADMIN — LISINOPRIL 20 MG: 20 TABLET ORAL at 18:41

## 2019-01-11 RX ADMIN — PANTOPRAZOLE SODIUM 40 MG: 40 TABLET, DELAYED RELEASE ORAL at 20:10

## 2019-01-11 RX ADMIN — FUROSEMIDE 40 MG: 10 INJECTION, SOLUTION INTRAMUSCULAR; INTRAVENOUS at 18:44

## 2019-01-11 RX ADMIN — BUSPIRONE HYDROCHLORIDE 15 MG: 5 TABLET ORAL at 20:10

## 2019-01-11 RX ADMIN — PANTOPRAZOLE SODIUM 40 MG: 40 TABLET, DELAYED RELEASE ORAL at 08:59

## 2019-01-11 RX ADMIN — OXYCODONE HYDROCHLORIDE AND ACETAMINOPHEN 1 TABLET: 10; 325 TABLET ORAL at 18:42

## 2019-01-11 RX ADMIN — ENOXAPARIN SODIUM 40 MG: 40 INJECTION SUBCUTANEOUS at 08:58

## 2019-01-11 ASSESSMENT — PAIN DESCRIPTION - LOCATION
LOCATION: GENERALIZED
LOCATION: GENERALIZED

## 2019-01-11 ASSESSMENT — ENCOUNTER SYMPTOMS
ALLERGIC/IMMUNOLOGIC NEGATIVE: 1
EYES NEGATIVE: 1
GASTROINTESTINAL NEGATIVE: 1
SHORTNESS OF BREATH: 1

## 2019-01-11 ASSESSMENT — PAIN SCALES - GENERAL
PAINLEVEL_OUTOF10: 0
PAINLEVEL_OUTOF10: 0
PAINLEVEL_OUTOF10: 7
PAINLEVEL_OUTOF10: 8
PAINLEVEL_OUTOF10: 7
PAINLEVEL_OUTOF10: 8
PAINLEVEL_OUTOF10: 3
PAINLEVEL_OUTOF10: 0

## 2019-01-11 ASSESSMENT — PAIN DESCRIPTION - PAIN TYPE
TYPE: CHRONIC PAIN
TYPE: CHRONIC PAIN

## 2019-01-12 PROBLEM — I50.33 ACUTE ON CHRONIC DIASTOLIC HEART FAILURE (HCC): Status: ACTIVE | Noted: 2019-01-12

## 2019-01-12 PROBLEM — E87.70 VOLUME OVERLOAD: Status: RESOLVED | Noted: 2019-01-10 | Resolved: 2019-01-12

## 2019-01-12 PROBLEM — I10 HYPERTENSION: Status: ACTIVE | Noted: 2019-01-12

## 2019-01-12 LAB
ANION GAP SERPL CALCULATED.3IONS-SCNC: 12 MMOL/L (ref 7–19)
BUN BLDV-MCNC: 28 MG/DL (ref 8–23)
CALCIUM SERPL-MCNC: 9.6 MG/DL (ref 8.2–9.6)
CHLORIDE BLD-SCNC: 100 MMOL/L (ref 98–111)
CO2: 29 MMOL/L (ref 22–29)
CREAT SERPL-MCNC: 1 MG/DL (ref 0.5–0.9)
GFR NON-AFRICAN AMERICAN: 51
GLUCOSE BLD-MCNC: 97 MG/DL (ref 74–109)
POTASSIUM SERPL-SCNC: 4 MMOL/L (ref 3.5–5)
SODIUM BLD-SCNC: 141 MMOL/L (ref 136–145)

## 2019-01-12 PROCEDURE — 6360000002 HC RX W HCPCS: Performed by: FAMILY MEDICINE

## 2019-01-12 PROCEDURE — 6370000000 HC RX 637 (ALT 250 FOR IP): Performed by: INTERNAL MEDICINE

## 2019-01-12 PROCEDURE — 80048 BASIC METABOLIC PNL TOTAL CA: CPT

## 2019-01-12 PROCEDURE — 2140000000 HC CCU INTERMEDIATE R&B

## 2019-01-12 PROCEDURE — 6370000000 HC RX 637 (ALT 250 FOR IP): Performed by: FAMILY MEDICINE

## 2019-01-12 PROCEDURE — 36415 COLL VENOUS BLD VENIPUNCTURE: CPT

## 2019-01-12 RX ORDER — CARVEDILOL 6.25 MG/1
6.25 TABLET ORAL 2 TIMES DAILY WITH MEALS
Status: DISCONTINUED | OUTPATIENT
Start: 2019-01-12 | End: 2019-01-16 | Stop reason: HOSPADM

## 2019-01-12 RX ORDER — LISINOPRIL 20 MG/1
40 TABLET ORAL DAILY
Status: DISCONTINUED | OUTPATIENT
Start: 2019-01-12 | End: 2019-01-16

## 2019-01-12 RX ADMIN — CARVEDILOL 6.25 MG: 6.25 TABLET, FILM COATED ORAL at 17:14

## 2019-01-12 RX ADMIN — FUROSEMIDE 40 MG: 10 INJECTION, SOLUTION INTRAMUSCULAR; INTRAVENOUS at 09:15

## 2019-01-12 RX ADMIN — FUROSEMIDE 40 MG: 10 INJECTION, SOLUTION INTRAMUSCULAR; INTRAVENOUS at 17:15

## 2019-01-12 RX ADMIN — ENOXAPARIN SODIUM 30 MG: 30 INJECTION SUBCUTANEOUS at 09:14

## 2019-01-12 RX ADMIN — SUCRALFATE 1 G: 1 TABLET ORAL at 17:14

## 2019-01-12 RX ADMIN — LISINOPRIL 40 MG: 20 TABLET ORAL at 17:15

## 2019-01-12 RX ADMIN — SUCRALFATE 1 G: 1 TABLET ORAL at 09:16

## 2019-01-12 RX ADMIN — SUCRALFATE 1 G: 1 TABLET ORAL at 12:24

## 2019-01-12 RX ADMIN — CYANOCOBALAMIN TAB 500 MCG 5000 MCG: 500 TAB at 09:15

## 2019-01-12 RX ADMIN — PANTOPRAZOLE SODIUM 40 MG: 40 TABLET, DELAYED RELEASE ORAL at 20:42

## 2019-01-12 RX ADMIN — FOLIC ACID TAB 400 MCG 800 MCG: 400 TAB at 09:14

## 2019-01-12 RX ADMIN — DICLOFENAC 2 G: 10 GEL TOPICAL at 09:14

## 2019-01-12 RX ADMIN — ASPIRIN 81 MG CHEWABLE TABLET 81 MG: 81 TABLET CHEWABLE at 17:14

## 2019-01-12 RX ADMIN — PENTOXIFYLLINE 400 MG: 400 TABLET, EXTENDED RELEASE ORAL at 12:24

## 2019-01-12 RX ADMIN — OXYCODONE HYDROCHLORIDE AND ACETAMINOPHEN 1 TABLET: 10; 325 TABLET ORAL at 14:10

## 2019-01-12 RX ADMIN — PANTOPRAZOLE SODIUM 40 MG: 40 TABLET, DELAYED RELEASE ORAL at 09:16

## 2019-01-12 RX ADMIN — OXYCODONE HYDROCHLORIDE AND ACETAMINOPHEN 1 TABLET: 10; 325 TABLET ORAL at 06:21

## 2019-01-12 RX ADMIN — SENNOSIDES 17.2 MG: 8.6 TABLET, FILM COATED ORAL at 09:15

## 2019-01-12 RX ADMIN — CYCLOBENZAPRINE HYDROCHLORIDE 5 MG: 10 TABLET, FILM COATED ORAL at 20:51

## 2019-01-12 RX ADMIN — PENTOXIFYLLINE 400 MG: 400 TABLET, EXTENDED RELEASE ORAL at 17:14

## 2019-01-12 RX ADMIN — CLONIDINE HYDROCHLORIDE 0.1 MG: 0.1 TABLET ORAL at 07:07

## 2019-01-12 RX ADMIN — CHOLECALCIFEROL CAP 125 MCG (5000 UNIT) 5000 UNITS: 125 CAP at 09:15

## 2019-01-12 RX ADMIN — HYDROXYZINE HYDROCHLORIDE 10 MG: 10 TABLET ORAL at 23:09

## 2019-01-12 RX ADMIN — SUCRALFATE 1 G: 1 TABLET ORAL at 20:42

## 2019-01-12 RX ADMIN — BUSPIRONE HYDROCHLORIDE 15 MG: 5 TABLET ORAL at 20:42

## 2019-01-12 RX ADMIN — PENTOXIFYLLINE 400 MG: 400 TABLET, EXTENDED RELEASE ORAL at 09:16

## 2019-01-12 RX ADMIN — BUSPIRONE HYDROCHLORIDE 15 MG: 5 TABLET ORAL at 09:15

## 2019-01-12 RX ADMIN — OXYCODONE HYDROCHLORIDE AND ACETAMINOPHEN 1 TABLET: 10; 325 TABLET ORAL at 20:50

## 2019-01-12 ASSESSMENT — PAIN SCALES - GENERAL
PAINLEVEL_OUTOF10: 9
PAINLEVEL_OUTOF10: 7
PAINLEVEL_OUTOF10: 9

## 2019-01-13 LAB
ANION GAP SERPL CALCULATED.3IONS-SCNC: 9 MMOL/L (ref 7–19)
BUN BLDV-MCNC: 31 MG/DL (ref 8–23)
CALCIUM SERPL-MCNC: 9.5 MG/DL (ref 8.2–9.6)
CHLORIDE BLD-SCNC: 96 MMOL/L (ref 98–111)
CO2: 36 MMOL/L (ref 22–29)
CREAT SERPL-MCNC: 1.1 MG/DL (ref 0.5–0.9)
GFR NON-AFRICAN AMERICAN: 46
GLUCOSE BLD-MCNC: 106 MG/DL (ref 74–109)
POTASSIUM SERPL-SCNC: 4 MMOL/L (ref 3.5–5)
SODIUM BLD-SCNC: 141 MMOL/L (ref 136–145)

## 2019-01-13 PROCEDURE — 6370000000 HC RX 637 (ALT 250 FOR IP): Performed by: INTERNAL MEDICINE

## 2019-01-13 PROCEDURE — 36415 COLL VENOUS BLD VENIPUNCTURE: CPT

## 2019-01-13 PROCEDURE — 6360000002 HC RX W HCPCS: Performed by: FAMILY MEDICINE

## 2019-01-13 PROCEDURE — 2140000000 HC CCU INTERMEDIATE R&B

## 2019-01-13 PROCEDURE — 80048 BASIC METABOLIC PNL TOTAL CA: CPT

## 2019-01-13 PROCEDURE — 6370000000 HC RX 637 (ALT 250 FOR IP): Performed by: FAMILY MEDICINE

## 2019-01-13 RX ORDER — FUROSEMIDE 10 MG/ML
40 INJECTION INTRAMUSCULAR; INTRAVENOUS DAILY
Status: DISCONTINUED | OUTPATIENT
Start: 2019-01-14 | End: 2019-01-14

## 2019-01-13 RX ADMIN — SUCRALFATE 1 G: 1 TABLET ORAL at 08:55

## 2019-01-13 RX ADMIN — PENTOXIFYLLINE 400 MG: 400 TABLET, EXTENDED RELEASE ORAL at 16:18

## 2019-01-13 RX ADMIN — ASPIRIN 81 MG CHEWABLE TABLET 81 MG: 81 TABLET CHEWABLE at 16:17

## 2019-01-13 RX ADMIN — SENNOSIDES 17.2 MG: 8.6 TABLET, FILM COATED ORAL at 08:54

## 2019-01-13 RX ADMIN — CHOLECALCIFEROL CAP 125 MCG (5000 UNIT) 5000 UNITS: 125 CAP at 08:55

## 2019-01-13 RX ADMIN — PANTOPRAZOLE SODIUM 40 MG: 40 TABLET, DELAYED RELEASE ORAL at 08:55

## 2019-01-13 RX ADMIN — CARVEDILOL 6.25 MG: 6.25 TABLET, FILM COATED ORAL at 16:17

## 2019-01-13 RX ADMIN — FUROSEMIDE 40 MG: 10 INJECTION, SOLUTION INTRAMUSCULAR; INTRAVENOUS at 08:53

## 2019-01-13 RX ADMIN — DICLOFENAC 2 G: 10 GEL TOPICAL at 08:55

## 2019-01-13 RX ADMIN — OXYCODONE HYDROCHLORIDE AND ACETAMINOPHEN 1 TABLET: 10; 325 TABLET ORAL at 20:14

## 2019-01-13 RX ADMIN — FOLIC ACID TAB 400 MCG 800 MCG: 400 TAB at 08:55

## 2019-01-13 RX ADMIN — SUCRALFATE 1 G: 1 TABLET ORAL at 16:17

## 2019-01-13 RX ADMIN — DICLOFENAC 2 G: 10 GEL TOPICAL at 20:10

## 2019-01-13 RX ADMIN — CARVEDILOL 6.25 MG: 6.25 TABLET, FILM COATED ORAL at 08:55

## 2019-01-13 RX ADMIN — BUSPIRONE HYDROCHLORIDE 15 MG: 5 TABLET ORAL at 19:52

## 2019-01-13 RX ADMIN — OXYCODONE HYDROCHLORIDE AND ACETAMINOPHEN 1 TABLET: 10; 325 TABLET ORAL at 16:18

## 2019-01-13 RX ADMIN — SUCRALFATE 1 G: 1 TABLET ORAL at 11:38

## 2019-01-13 RX ADMIN — LISINOPRIL 40 MG: 20 TABLET ORAL at 16:18

## 2019-01-13 RX ADMIN — SUCRALFATE 1 G: 1 TABLET ORAL at 19:51

## 2019-01-13 RX ADMIN — PENTOXIFYLLINE 400 MG: 400 TABLET, EXTENDED RELEASE ORAL at 08:54

## 2019-01-13 RX ADMIN — ENOXAPARIN SODIUM 30 MG: 30 INJECTION SUBCUTANEOUS at 08:55

## 2019-01-13 RX ADMIN — PANTOPRAZOLE SODIUM 40 MG: 40 TABLET, DELAYED RELEASE ORAL at 19:52

## 2019-01-13 RX ADMIN — OXYCODONE HYDROCHLORIDE AND ACETAMINOPHEN 1 TABLET: 10; 325 TABLET ORAL at 08:54

## 2019-01-13 RX ADMIN — CYANOCOBALAMIN TAB 500 MCG 5000 MCG: 500 TAB at 08:54

## 2019-01-13 RX ADMIN — HYDROXYZINE HYDROCHLORIDE 10 MG: 10 TABLET ORAL at 19:52

## 2019-01-13 RX ADMIN — BUSPIRONE HYDROCHLORIDE 15 MG: 5 TABLET ORAL at 08:55

## 2019-01-13 RX ADMIN — PENTOXIFYLLINE 400 MG: 400 TABLET, EXTENDED RELEASE ORAL at 11:38

## 2019-01-13 ASSESSMENT — PAIN DESCRIPTION - LOCATION
LOCATION: BACK
LOCATION: BACK

## 2019-01-13 ASSESSMENT — PAIN SCALES - GENERAL
PAINLEVEL_OUTOF10: 8
PAINLEVEL_OUTOF10: 6
PAINLEVEL_OUTOF10: 0
PAINLEVEL_OUTOF10: 6

## 2019-01-13 ASSESSMENT — PAIN DESCRIPTION - PAIN TYPE
TYPE: CHRONIC PAIN
TYPE: CHRONIC PAIN

## 2019-01-14 PROBLEM — E44.0 MODERATE MALNUTRITION (HCC): Chronic | Status: ACTIVE | Noted: 2019-01-14

## 2019-01-14 LAB
ANION GAP SERPL CALCULATED.3IONS-SCNC: 8 MMOL/L (ref 7–19)
BUN BLDV-MCNC: 37 MG/DL (ref 8–23)
CALCIUM SERPL-MCNC: 9.2 MG/DL (ref 8.2–9.6)
CHLORIDE BLD-SCNC: 96 MMOL/L (ref 98–111)
CO2: 35 MMOL/L (ref 22–29)
CREAT SERPL-MCNC: 1 MG/DL (ref 0.5–0.9)
GFR NON-AFRICAN AMERICAN: 51
GLUCOSE BLD-MCNC: 100 MG/DL (ref 74–109)
POTASSIUM SERPL-SCNC: 4.3 MMOL/L (ref 3.5–5)
SODIUM BLD-SCNC: 139 MMOL/L (ref 136–145)

## 2019-01-14 PROCEDURE — 6360000002 HC RX W HCPCS: Performed by: FAMILY MEDICINE

## 2019-01-14 PROCEDURE — 6370000000 HC RX 637 (ALT 250 FOR IP): Performed by: INTERNAL MEDICINE

## 2019-01-14 PROCEDURE — 99233 SBSQ HOSP IP/OBS HIGH 50: CPT | Performed by: INTERNAL MEDICINE

## 2019-01-14 PROCEDURE — 6370000000 HC RX 637 (ALT 250 FOR IP): Performed by: FAMILY MEDICINE

## 2019-01-14 PROCEDURE — 36415 COLL VENOUS BLD VENIPUNCTURE: CPT

## 2019-01-14 PROCEDURE — 80048 BASIC METABOLIC PNL TOTAL CA: CPT

## 2019-01-14 PROCEDURE — 2140000000 HC CCU INTERMEDIATE R&B

## 2019-01-14 PROCEDURE — 6360000002 HC RX W HCPCS: Performed by: INTERNAL MEDICINE

## 2019-01-14 RX ORDER — FUROSEMIDE 40 MG/1
40 TABLET ORAL DAILY
Status: DISCONTINUED | OUTPATIENT
Start: 2019-01-15 | End: 2019-01-15

## 2019-01-14 RX ADMIN — SUCRALFATE 1 G: 1 TABLET ORAL at 16:59

## 2019-01-14 RX ADMIN — PANTOPRAZOLE SODIUM 40 MG: 40 TABLET, DELAYED RELEASE ORAL at 09:33

## 2019-01-14 RX ADMIN — SUCRALFATE 1 G: 1 TABLET ORAL at 08:25

## 2019-01-14 RX ADMIN — FOLIC ACID TAB 400 MCG 800 MCG: 400 TAB at 09:33

## 2019-01-14 RX ADMIN — CYANOCOBALAMIN TAB 500 MCG 5000 MCG: 500 TAB at 09:32

## 2019-01-14 RX ADMIN — SUCRALFATE 1 G: 1 TABLET ORAL at 21:33

## 2019-01-14 RX ADMIN — LISINOPRIL 40 MG: 20 TABLET ORAL at 16:59

## 2019-01-14 RX ADMIN — OXYCODONE HYDROCHLORIDE AND ACETAMINOPHEN 1 TABLET: 10; 325 TABLET ORAL at 12:17

## 2019-01-14 RX ADMIN — OXYCODONE HYDROCHLORIDE AND ACETAMINOPHEN 1 TABLET: 10; 325 TABLET ORAL at 06:24

## 2019-01-14 RX ADMIN — BUSPIRONE HYDROCHLORIDE 15 MG: 5 TABLET ORAL at 09:33

## 2019-01-14 RX ADMIN — FUROSEMIDE 40 MG: 10 INJECTION, SOLUTION INTRAMUSCULAR; INTRAVENOUS at 09:32

## 2019-01-14 RX ADMIN — ENOXAPARIN SODIUM 30 MG: 30 INJECTION SUBCUTANEOUS at 09:32

## 2019-01-14 RX ADMIN — DICLOFENAC 2 G: 10 GEL TOPICAL at 21:33

## 2019-01-14 RX ADMIN — OXYCODONE HYDROCHLORIDE AND ACETAMINOPHEN 1 TABLET: 10; 325 TABLET ORAL at 21:35

## 2019-01-14 RX ADMIN — SUCRALFATE 1 G: 1 TABLET ORAL at 11:58

## 2019-01-14 RX ADMIN — ASPIRIN 81 MG CHEWABLE TABLET 81 MG: 81 TABLET CHEWABLE at 16:58

## 2019-01-14 RX ADMIN — GUAIFENESIN 400 MG: 200 TABLET ORAL at 23:34

## 2019-01-14 RX ADMIN — BUSPIRONE HYDROCHLORIDE 15 MG: 5 TABLET ORAL at 21:33

## 2019-01-14 RX ADMIN — PENTOXIFYLLINE 400 MG: 400 TABLET, EXTENDED RELEASE ORAL at 16:59

## 2019-01-14 RX ADMIN — CHOLECALCIFEROL CAP 125 MCG (5000 UNIT) 5000 UNITS: 125 CAP at 09:33

## 2019-01-14 RX ADMIN — PENTOXIFYLLINE 400 MG: 400 TABLET, EXTENDED RELEASE ORAL at 11:59

## 2019-01-14 RX ADMIN — DICLOFENAC 2 G: 10 GEL TOPICAL at 09:44

## 2019-01-14 RX ADMIN — CARVEDILOL 6.25 MG: 6.25 TABLET, FILM COATED ORAL at 16:58

## 2019-01-14 RX ADMIN — ALBUTEROL SULFATE 2 PUFF: 90 AEROSOL, METERED RESPIRATORY (INHALATION) at 07:30

## 2019-01-14 RX ADMIN — ALBUTEROL SULFATE 2 PUFF: 90 AEROSOL, METERED RESPIRATORY (INHALATION) at 21:33

## 2019-01-14 RX ADMIN — PANTOPRAZOLE SODIUM 40 MG: 40 TABLET, DELAYED RELEASE ORAL at 21:33

## 2019-01-14 RX ADMIN — CARVEDILOL 6.25 MG: 6.25 TABLET, FILM COATED ORAL at 08:24

## 2019-01-14 RX ADMIN — PENTOXIFYLLINE 400 MG: 400 TABLET, EXTENDED RELEASE ORAL at 08:25

## 2019-01-14 RX ADMIN — SENNOSIDES 17.2 MG: 8.6 TABLET, FILM COATED ORAL at 09:32

## 2019-01-14 ASSESSMENT — PAIN DESCRIPTION - DESCRIPTORS: DESCRIPTORS: OTHER (COMMENT)

## 2019-01-14 ASSESSMENT — PAIN SCALES - GENERAL
PAINLEVEL_OUTOF10: 6
PAINLEVEL_OUTOF10: 0
PAINLEVEL_OUTOF10: 8
PAINLEVEL_OUTOF10: 8

## 2019-01-14 ASSESSMENT — PAIN DESCRIPTION - PAIN TYPE: TYPE: CHRONIC PAIN

## 2019-01-14 ASSESSMENT — PAIN DESCRIPTION - ORIENTATION: ORIENTATION: LEFT

## 2019-01-14 ASSESSMENT — PAIN DESCRIPTION - LOCATION: LOCATION: SHOULDER

## 2019-01-15 LAB
ANION GAP SERPL CALCULATED.3IONS-SCNC: 5 MMOL/L (ref 7–19)
BUN BLDV-MCNC: 41 MG/DL (ref 8–23)
CALCIUM SERPL-MCNC: 9 MG/DL (ref 8.2–9.6)
CHLORIDE BLD-SCNC: 95 MMOL/L (ref 98–111)
CO2: 37 MMOL/L (ref 22–29)
CREAT SERPL-MCNC: 1.2 MG/DL (ref 0.5–0.9)
GFR NON-AFRICAN AMERICAN: 41
GLUCOSE BLD-MCNC: 103 MG/DL (ref 74–109)
POTASSIUM SERPL-SCNC: 4.4 MMOL/L (ref 3.5–5)
SODIUM BLD-SCNC: 137 MMOL/L (ref 136–145)

## 2019-01-15 PROCEDURE — 2140000000 HC CCU INTERMEDIATE R&B

## 2019-01-15 PROCEDURE — 2700000000 HC OXYGEN THERAPY PER DAY

## 2019-01-15 PROCEDURE — 6360000002 HC RX W HCPCS: Performed by: FAMILY MEDICINE

## 2019-01-15 PROCEDURE — 36415 COLL VENOUS BLD VENIPUNCTURE: CPT

## 2019-01-15 PROCEDURE — 6370000000 HC RX 637 (ALT 250 FOR IP): Performed by: INTERNAL MEDICINE

## 2019-01-15 PROCEDURE — 6370000000 HC RX 637 (ALT 250 FOR IP): Performed by: FAMILY MEDICINE

## 2019-01-15 PROCEDURE — 80048 BASIC METABOLIC PNL TOTAL CA: CPT

## 2019-01-15 PROCEDURE — 99233 SBSQ HOSP IP/OBS HIGH 50: CPT | Performed by: INTERNAL MEDICINE

## 2019-01-15 RX ORDER — FUROSEMIDE 20 MG/1
20 TABLET ORAL DAILY
Status: DISCONTINUED | OUTPATIENT
Start: 2019-01-16 | End: 2019-01-16

## 2019-01-15 RX ADMIN — OXYCODONE HYDROCHLORIDE AND ACETAMINOPHEN 1 TABLET: 10; 325 TABLET ORAL at 18:19

## 2019-01-15 RX ADMIN — PENTOXIFYLLINE 400 MG: 400 TABLET, EXTENDED RELEASE ORAL at 08:56

## 2019-01-15 RX ADMIN — PENTOXIFYLLINE 400 MG: 400 TABLET, EXTENDED RELEASE ORAL at 12:32

## 2019-01-15 RX ADMIN — OXYCODONE HYDROCHLORIDE AND ACETAMINOPHEN 1 TABLET: 10; 325 TABLET ORAL at 09:20

## 2019-01-15 RX ADMIN — SUCRALFATE 1 G: 1 TABLET ORAL at 08:56

## 2019-01-15 RX ADMIN — ALBUTEROL SULFATE 2 PUFF: 90 AEROSOL, METERED RESPIRATORY (INHALATION) at 06:39

## 2019-01-15 RX ADMIN — BUSPIRONE HYDROCHLORIDE 15 MG: 5 TABLET ORAL at 19:51

## 2019-01-15 RX ADMIN — PANTOPRAZOLE SODIUM 40 MG: 40 TABLET, DELAYED RELEASE ORAL at 19:51

## 2019-01-15 RX ADMIN — ASPIRIN 81 MG CHEWABLE TABLET 81 MG: 81 TABLET CHEWABLE at 18:17

## 2019-01-15 RX ADMIN — CARVEDILOL 6.25 MG: 6.25 TABLET, FILM COATED ORAL at 08:55

## 2019-01-15 RX ADMIN — CARVEDILOL 6.25 MG: 6.25 TABLET, FILM COATED ORAL at 18:17

## 2019-01-15 RX ADMIN — BUSPIRONE HYDROCHLORIDE 15 MG: 5 TABLET ORAL at 08:56

## 2019-01-15 RX ADMIN — SUCRALFATE 1 G: 1 TABLET ORAL at 18:17

## 2019-01-15 RX ADMIN — DICLOFENAC 2 G: 10 GEL TOPICAL at 09:02

## 2019-01-15 RX ADMIN — SUCRALFATE 1 G: 1 TABLET ORAL at 19:51

## 2019-01-15 RX ADMIN — SENNOSIDES 17.2 MG: 8.6 TABLET, FILM COATED ORAL at 08:56

## 2019-01-15 RX ADMIN — FUROSEMIDE 40 MG: 40 TABLET ORAL at 08:56

## 2019-01-15 RX ADMIN — GUAIFENESIN 400 MG: 200 TABLET ORAL at 18:21

## 2019-01-15 RX ADMIN — ENOXAPARIN SODIUM 30 MG: 30 INJECTION SUBCUTANEOUS at 08:56

## 2019-01-15 RX ADMIN — PENTOXIFYLLINE 400 MG: 400 TABLET, EXTENDED RELEASE ORAL at 18:17

## 2019-01-15 RX ADMIN — DICLOFENAC 2 G: 10 GEL TOPICAL at 19:52

## 2019-01-15 RX ADMIN — GUAIFENESIN 400 MG: 200 TABLET ORAL at 06:42

## 2019-01-15 RX ADMIN — CLONIDINE HYDROCHLORIDE 0.1 MG: 0.1 TABLET ORAL at 06:38

## 2019-01-15 RX ADMIN — SUCRALFATE 1 G: 1 TABLET ORAL at 12:32

## 2019-01-15 RX ADMIN — PANTOPRAZOLE SODIUM 40 MG: 40 TABLET, DELAYED RELEASE ORAL at 08:56

## 2019-01-15 RX ADMIN — LISINOPRIL 40 MG: 20 TABLET ORAL at 18:17

## 2019-01-15 ASSESSMENT — PAIN SCALES - GENERAL
PAINLEVEL_OUTOF10: 0
PAINLEVEL_OUTOF10: 0
PAINLEVEL_OUTOF10: 8
PAINLEVEL_OUTOF10: 8

## 2019-01-16 VITALS
TEMPERATURE: 97.9 F | RESPIRATION RATE: 18 BRPM | HEIGHT: 67 IN | OXYGEN SATURATION: 95 % | BODY MASS INDEX: 19.53 KG/M2 | HEART RATE: 53 BPM | WEIGHT: 124.44 LBS | DIASTOLIC BLOOD PRESSURE: 63 MMHG | SYSTOLIC BLOOD PRESSURE: 154 MMHG

## 2019-01-16 PROCEDURE — 6370000000 HC RX 637 (ALT 250 FOR IP): Performed by: INTERNAL MEDICINE

## 2019-01-16 PROCEDURE — 6360000002 HC RX W HCPCS: Performed by: FAMILY MEDICINE

## 2019-01-16 PROCEDURE — 6370000000 HC RX 637 (ALT 250 FOR IP): Performed by: FAMILY MEDICINE

## 2019-01-16 RX ORDER — FUROSEMIDE 40 MG/1
40 TABLET ORAL DAILY
Status: DISCONTINUED | OUTPATIENT
Start: 2019-01-17 | End: 2019-01-16 | Stop reason: HOSPADM

## 2019-01-16 RX ORDER — AMLODIPINE BESYLATE 5 MG/1
5 TABLET ORAL 2 TIMES DAILY
Qty: 30 TABLET | Refills: 3 | Status: SHIPPED | OUTPATIENT
Start: 2019-01-16 | End: 2019-02-27 | Stop reason: SDUPTHER

## 2019-01-16 RX ORDER — LISINOPRIL 20 MG/1
20 TABLET ORAL 2 TIMES DAILY
Status: DISCONTINUED | OUTPATIENT
Start: 2019-01-16 | End: 2019-01-16 | Stop reason: HOSPADM

## 2019-01-16 RX ORDER — FUROSEMIDE 40 MG/1
40 TABLET ORAL DAILY
Qty: 60 TABLET | Refills: 3 | Status: ON HOLD | OUTPATIENT
Start: 2019-01-17 | End: 2020-01-01 | Stop reason: HOSPADM

## 2019-01-16 RX ORDER — CARVEDILOL 6.25 MG/1
6.25 TABLET ORAL 2 TIMES DAILY WITH MEALS
Qty: 60 TABLET | Refills: 3 | Status: SHIPPED | OUTPATIENT
Start: 2019-01-16 | End: 2019-02-27 | Stop reason: SDUPTHER

## 2019-01-16 RX ORDER — LISINOPRIL 20 MG/1
20 TABLET ORAL 2 TIMES DAILY
Qty: 30 TABLET | Refills: 3 | Status: ON HOLD | OUTPATIENT
Start: 2019-01-16 | End: 2020-01-01 | Stop reason: HOSPADM

## 2019-01-16 RX ORDER — AMLODIPINE BESYLATE 5 MG/1
5 TABLET ORAL 2 TIMES DAILY
Status: DISCONTINUED | OUTPATIENT
Start: 2019-01-16 | End: 2019-01-16 | Stop reason: HOSPADM

## 2019-01-16 RX ORDER — BISACODYL 10 MG
10 SUPPOSITORY, RECTAL RECTAL DAILY PRN
Status: DISCONTINUED | OUTPATIENT
Start: 2019-01-16 | End: 2019-01-16 | Stop reason: HOSPADM

## 2019-01-16 RX ORDER — CLONIDINE HYDROCHLORIDE 0.1 MG/1
0.1 TABLET ORAL EVERY 4 HOURS PRN
Qty: 30 TABLET | Refills: 0 | Status: SHIPPED | OUTPATIENT
Start: 2019-01-16 | End: 2019-01-01

## 2019-01-16 RX ADMIN — BUSPIRONE HYDROCHLORIDE 15 MG: 5 TABLET ORAL at 09:09

## 2019-01-16 RX ADMIN — FOLIC ACID TAB 400 MCG 800 MCG: 400 TAB at 09:11

## 2019-01-16 RX ADMIN — CARVEDILOL 6.25 MG: 6.25 TABLET, FILM COATED ORAL at 09:09

## 2019-01-16 RX ADMIN — CYANOCOBALAMIN TAB 500 MCG 5000 MCG: 500 TAB at 09:09

## 2019-01-16 RX ADMIN — DICLOFENAC 2 G: 10 GEL TOPICAL at 09:11

## 2019-01-16 RX ADMIN — BISACODYL 10 MG: 10 SUPPOSITORY RECTAL at 10:15

## 2019-01-16 RX ADMIN — PENTOXIFYLLINE 400 MG: 400 TABLET, EXTENDED RELEASE ORAL at 09:10

## 2019-01-16 RX ADMIN — FUROSEMIDE 20 MG: 20 TABLET ORAL at 09:11

## 2019-01-16 RX ADMIN — CLONIDINE HYDROCHLORIDE 0.1 MG: 0.1 TABLET ORAL at 07:27

## 2019-01-16 RX ADMIN — CHOLECALCIFEROL CAP 125 MCG (5000 UNIT) 5000 UNITS: 125 CAP at 09:11

## 2019-01-16 RX ADMIN — GUAIFENESIN 400 MG: 200 TABLET ORAL at 01:33

## 2019-01-16 RX ADMIN — OXYCODONE HYDROCHLORIDE AND ACETAMINOPHEN 1 TABLET: 10; 325 TABLET ORAL at 05:42

## 2019-01-16 RX ADMIN — SENNOSIDES 17.2 MG: 8.6 TABLET, FILM COATED ORAL at 09:09

## 2019-01-16 RX ADMIN — OXYCODONE HYDROCHLORIDE AND ACETAMINOPHEN 1 TABLET: 10; 325 TABLET ORAL at 01:32

## 2019-01-16 RX ADMIN — PANTOPRAZOLE SODIUM 40 MG: 40 TABLET, DELAYED RELEASE ORAL at 09:09

## 2019-01-16 RX ADMIN — SUCRALFATE 1 G: 1 TABLET ORAL at 09:11

## 2019-01-16 RX ADMIN — ENOXAPARIN SODIUM 30 MG: 30 INJECTION SUBCUTANEOUS at 09:12

## 2019-01-16 ASSESSMENT — PAIN SCALES - GENERAL
PAINLEVEL_OUTOF10: 8
PAINLEVEL_OUTOF10: 8

## 2019-01-17 ENCOUNTER — CARE COORDINATION (OUTPATIENT)
Dept: CASE MANAGEMENT | Age: 84
End: 2019-01-17

## 2019-01-17 DIAGNOSIS — I50.33 ACUTE ON CHRONIC DIASTOLIC HEART FAILURE (HCC): Primary | ICD-10-CM

## 2019-01-17 PROCEDURE — 1111F DSCHRG MED/CURRENT MED MERGE: CPT | Performed by: FAMILY MEDICINE

## 2019-02-05 ENCOUNTER — CARE COORDINATION (OUTPATIENT)
Dept: CARE COORDINATION | Age: 84
End: 2019-02-05

## 2019-02-18 ENCOUNTER — CARE COORDINATION (OUTPATIENT)
Dept: CARE COORDINATION | Age: 84
End: 2019-02-18

## 2019-02-27 ENCOUNTER — OFFICE VISIT (OUTPATIENT)
Dept: CARDIOLOGY | Age: 84
End: 2019-02-27
Payer: MEDICARE

## 2019-02-27 VITALS
WEIGHT: 125 LBS | HEIGHT: 67 IN | HEART RATE: 68 BPM | SYSTOLIC BLOOD PRESSURE: 120 MMHG | DIASTOLIC BLOOD PRESSURE: 60 MMHG | BODY MASS INDEX: 19.62 KG/M2

## 2019-02-27 DIAGNOSIS — I35.0 NONRHEUMATIC AORTIC VALVE STENOSIS: ICD-10-CM

## 2019-02-27 DIAGNOSIS — I50.33 ACUTE ON CHRONIC DIASTOLIC HEART FAILURE (HCC): Primary | ICD-10-CM

## 2019-02-27 DIAGNOSIS — I10 ESSENTIAL HYPERTENSION: ICD-10-CM

## 2019-02-27 PROCEDURE — 99214 OFFICE O/P EST MOD 30 MIN: CPT | Performed by: NURSE PRACTITIONER

## 2019-02-27 RX ORDER — CARVEDILOL 6.25 MG/1
6.25 TABLET ORAL 2 TIMES DAILY WITH MEALS
Qty: 180 TABLET | Refills: 3 | Status: SHIPPED | OUTPATIENT
Start: 2019-02-27

## 2019-02-27 RX ORDER — AMLODIPINE BESYLATE 5 MG/1
5 TABLET ORAL 2 TIMES DAILY
Qty: 180 TABLET | Refills: 3 | Status: SHIPPED | OUTPATIENT
Start: 2019-02-27 | End: 2020-01-01

## 2019-03-06 ENCOUNTER — CARE COORDINATION (OUTPATIENT)
Dept: CASE MANAGEMENT | Age: 84
End: 2019-03-06

## 2019-03-29 ENCOUNTER — CARE COORDINATION (OUTPATIENT)
Dept: CASE MANAGEMENT | Age: 84
End: 2019-03-29

## 2019-04-16 ENCOUNTER — CARE COORDINATION (OUTPATIENT)
Dept: CASE MANAGEMENT | Age: 84
End: 2019-04-16

## 2019-04-16 NOTE — CARE COORDINATION
[facility] Transitions Follow Up Call    2019    Patient: Kemal Koch  Patient : 1920   MRN: <[id number]>  Reason for Admission:   Discharge Date: 19 RARS: Readmission Risk Score: 16         Spoke with: Patients daughter     Care Transitions Subsequent and Final Call    Subsequent and Final Calls  Have your medications changed?:  No  Do you have any questions related to your medications?:  No  Do you currently have any active services?:  No  Do you have any needs or concerns that I can assist you with?:  No  Identified Barriers:  None  Care Transitions Interventions  Other Interventions: Follow Up : Spoke with patients daughter who said Ilana Sullivan is doing really well,. Has gained a couple of pounds but said it is not swelling it is due to Eileen's appetite increasing lately and said Ilana Sullivan has been eating more and has a sweet tooth lately. Daughter said that Ilana Sullivan is weighing and they know when to call the doctor. Daughter said that Ilana Sullivan sees Dr. Bruce Kinsey next week and she saw Dr. Phil Chavez last week. Daughter said the doctors keep a close eye on her. Daughter said that Ilana Sullivan is not having any difficulty breathing and she is getting around the house pretty good. Daughter said Ilana Sullivan has small swelling on her left ankle from where she hit it on her walker, daughter said it is only a small area but she has no swelling from fluid in her lower extremities.  Will continue to follow   Future Appointments   Date Time Provider Ruperto Mccoy   2019  1:45 PM Abraham Hanna MD [facility] P-SUMIT Lopez RN

## 2020-01-01 ENCOUNTER — CARE COORDINATION (OUTPATIENT)
Dept: CASE MANAGEMENT | Age: 85
End: 2020-01-01

## 2020-01-01 ENCOUNTER — APPOINTMENT (OUTPATIENT)
Dept: GENERAL RADIOLOGY | Age: 85
DRG: 189 | End: 2020-01-01
Payer: MEDICARE

## 2020-01-01 ENCOUNTER — HOSPITAL ENCOUNTER (INPATIENT)
Age: 85
LOS: 5 days | Discharge: HOME HEALTH | DRG: 189 | End: 2020-03-22
Attending: EMERGENCY MEDICINE | Admitting: FAMILY MEDICINE
Payer: MEDICARE

## 2020-01-01 ENCOUNTER — OFFICE VISIT (OUTPATIENT)
Dept: CARDIOLOGY | Age: 85
End: 2020-01-01
Payer: MEDICARE

## 2020-01-01 ENCOUNTER — HOSPITAL ENCOUNTER (INPATIENT)
Age: 85
LOS: 1 days | Discharge: HOSPICE | DRG: 189 | End: 2020-03-30
Attending: EMERGENCY MEDICINE | Admitting: FAMILY MEDICINE
Payer: MEDICARE

## 2020-01-01 VITALS
DIASTOLIC BLOOD PRESSURE: 72 MMHG | OXYGEN SATURATION: 92 % | TEMPERATURE: 98.9 F | SYSTOLIC BLOOD PRESSURE: 112 MMHG | BODY MASS INDEX: 21.5 KG/M2 | HEART RATE: 74 BPM | RESPIRATION RATE: 18 BRPM | WEIGHT: 137 LBS | HEIGHT: 67 IN

## 2020-01-01 VITALS
RESPIRATION RATE: 19 BRPM | SYSTOLIC BLOOD PRESSURE: 152 MMHG | OXYGEN SATURATION: 89 % | TEMPERATURE: 99.3 F | BODY MASS INDEX: 21.19 KG/M2 | WEIGHT: 135 LBS | HEART RATE: 95 BPM | DIASTOLIC BLOOD PRESSURE: 72 MMHG | HEIGHT: 67 IN

## 2020-01-01 VITALS
DIASTOLIC BLOOD PRESSURE: 76 MMHG | SYSTOLIC BLOOD PRESSURE: 118 MMHG | BODY MASS INDEX: 21.22 KG/M2 | OXYGEN SATURATION: 99 % | HEIGHT: 68 IN | WEIGHT: 140 LBS | HEART RATE: 74 BPM

## 2020-01-01 LAB
ALBUMIN SERPL-MCNC: 2.6 G/DL (ref 3.5–5.2)
ALBUMIN SERPL-MCNC: 3.3 G/DL (ref 3.5–5.2)
ALP BLD-CCNC: 71 U/L (ref 35–104)
ALP BLD-CCNC: 72 U/L (ref 35–104)
ALT SERPL-CCNC: 16 U/L (ref 5–33)
ALT SERPL-CCNC: <5 U/L (ref 5–33)
ANION GAP SERPL CALCULATED.3IONS-SCNC: 12 MMOL/L (ref 7–19)
ANION GAP SERPL CALCULATED.3IONS-SCNC: 13 MMOL/L (ref 7–19)
ANION GAP SERPL CALCULATED.3IONS-SCNC: 14 MMOL/L (ref 7–19)
ANION GAP SERPL CALCULATED.3IONS-SCNC: 14 MMOL/L (ref 7–19)
ANION GAP SERPL CALCULATED.3IONS-SCNC: 15 MMOL/L (ref 7–19)
ANION GAP SERPL CALCULATED.3IONS-SCNC: 18 MMOL/L (ref 7–19)
ANION GAP SERPL CALCULATED.3IONS-SCNC: 18 MMOL/L (ref 7–19)
APTT: 30.8 SEC (ref 26–36.2)
APTT: 35 SEC (ref 26–36.2)
AST SERPL-CCNC: 14 U/L (ref 5–32)
AST SERPL-CCNC: 15 U/L (ref 5–32)
BACTERIA: NEGATIVE /HPF
BASE EXCESS ARTERIAL: -3 MMOL/L (ref -2–2)
BASE EXCESS ARTERIAL: 11.7 MMOL/L (ref -2–2)
BASOPHILS ABSOLUTE: 0 K/UL (ref 0–0.2)
BASOPHILS ABSOLUTE: 0.1 K/UL (ref 0–0.2)
BASOPHILS ABSOLUTE: 0.1 K/UL (ref 0–0.2)
BASOPHILS RELATIVE PERCENT: 0.2 % (ref 0–1)
BASOPHILS RELATIVE PERCENT: 0.3 % (ref 0–1)
BASOPHILS RELATIVE PERCENT: 0.5 % (ref 0–1)
BILIRUB SERPL-MCNC: 0.3 MG/DL (ref 0.2–1.2)
BILIRUB SERPL-MCNC: <0.2 MG/DL (ref 0.2–1.2)
BILIRUBIN URINE: NEGATIVE
BLOOD CULTURE, ROUTINE: NORMAL
BLOOD CULTURE, ROUTINE: NORMAL
BLOOD, URINE: NEGATIVE
BUN BLDV-MCNC: 26 MG/DL (ref 8–23)
BUN BLDV-MCNC: 29 MG/DL (ref 8–23)
BUN BLDV-MCNC: 32 MG/DL (ref 8–23)
BUN BLDV-MCNC: 39 MG/DL (ref 8–23)
BUN BLDV-MCNC: 41 MG/DL (ref 8–23)
BUN BLDV-MCNC: 41 MG/DL (ref 8–23)
BUN BLDV-MCNC: 42 MG/DL (ref 8–23)
CALCIUM SERPL-MCNC: 10 MG/DL (ref 8.2–9.6)
CALCIUM SERPL-MCNC: 8.8 MG/DL (ref 8.2–9.6)
CALCIUM SERPL-MCNC: 8.8 MG/DL (ref 8.2–9.6)
CALCIUM SERPL-MCNC: 8.9 MG/DL (ref 8.2–9.6)
CALCIUM SERPL-MCNC: 9.5 MG/DL (ref 8.2–9.6)
CALCIUM SERPL-MCNC: 9.6 MG/DL (ref 8.2–9.6)
CALCIUM SERPL-MCNC: 9.7 MG/DL (ref 8.2–9.6)
CARBOXYHEMOGLOBIN ARTERIAL: 0.1 % (ref 0–5)
CARBOXYHEMOGLOBIN ARTERIAL: 1.5 % (ref 0–5)
CHLORIDE BLD-SCNC: 100 MMOL/L (ref 98–111)
CHLORIDE BLD-SCNC: 101 MMOL/L (ref 98–111)
CHLORIDE BLD-SCNC: 107 MMOL/L (ref 98–111)
CHLORIDE BLD-SCNC: 95 MMOL/L (ref 98–111)
CHLORIDE BLD-SCNC: 95 MMOL/L (ref 98–111)
CHLORIDE BLD-SCNC: 96 MMOL/L (ref 98–111)
CHLORIDE BLD-SCNC: 97 MMOL/L (ref 98–111)
CLARITY: CLEAR
CO2: 20 MMOL/L (ref 22–29)
CO2: 21 MMOL/L (ref 22–29)
CO2: 22 MMOL/L (ref 22–29)
CO2: 24 MMOL/L (ref 22–29)
CO2: 27 MMOL/L (ref 22–29)
CO2: 27 MMOL/L (ref 22–29)
CO2: 31 MMOL/L (ref 22–29)
COLOR: YELLOW
CREAT SERPL-MCNC: 1 MG/DL (ref 0.5–0.9)
CREAT SERPL-MCNC: 1.1 MG/DL (ref 0.5–0.9)
CREAT SERPL-MCNC: 1.1 MG/DL (ref 0.5–0.9)
CREAT SERPL-MCNC: 1.2 MG/DL (ref 0.5–0.9)
CREAT SERPL-MCNC: 1.2 MG/DL (ref 0.5–0.9)
CULTURE, BLOOD 2: NORMAL
CULTURE, BLOOD 2: NORMAL
EKG P AXIS: 76 DEGREES
EKG P-R INTERVAL: 178 MS
EKG Q-T INTERVAL: 366 MS
EKG QRS DURATION: 108 MS
EKG QTC CALCULATION (BAZETT): 422 MS
EKG T AXIS: 109 DEGREES
EOSINOPHILS ABSOLUTE: 0 K/UL (ref 0–0.6)
EOSINOPHILS ABSOLUTE: 0.6 K/UL (ref 0–0.6)
EOSINOPHILS ABSOLUTE: 1 K/UL (ref 0–0.6)
EOSINOPHILS RELATIVE PERCENT: 0 % (ref 0–5)
EOSINOPHILS RELATIVE PERCENT: 4 % (ref 0–5)
EOSINOPHILS RELATIVE PERCENT: 6.7 % (ref 0–5)
EPITHELIAL CELLS, UA: 1 /HPF (ref 0–5)
GFR NON-AFRICAN AMERICAN: 41
GFR NON-AFRICAN AMERICAN: 41
GFR NON-AFRICAN AMERICAN: 46
GFR NON-AFRICAN AMERICAN: 46
GFR NON-AFRICAN AMERICAN: 51
GLUCOSE BLD-MCNC: 114 MG/DL (ref 74–109)
GLUCOSE BLD-MCNC: 118 MG/DL (ref 74–109)
GLUCOSE BLD-MCNC: 120 MG/DL (ref 74–109)
GLUCOSE BLD-MCNC: 121 MG/DL (ref 74–109)
GLUCOSE BLD-MCNC: 123 MG/DL (ref 74–109)
GLUCOSE URINE: NEGATIVE MG/DL
HCO3 ARTERIAL: 22 MMOL/L (ref 22–26)
HCO3 ARTERIAL: 34.9 MMOL/L (ref 22–26)
HCT VFR BLD CALC: 33.9 % (ref 37–47)
HCT VFR BLD CALC: 37.6 % (ref 37–47)
HCT VFR BLD CALC: 38.2 % (ref 37–47)
HEMOGLOBIN, ART, EXTENDED: 11.3 G/DL (ref 12–16)
HEMOGLOBIN, ART, EXTENDED: 11.8 G/DL (ref 12–16)
HEMOGLOBIN: 10.9 G/DL (ref 12–16)
HEMOGLOBIN: 11.9 G/DL (ref 12–16)
HEMOGLOBIN: 12.1 G/DL (ref 12–16)
HYALINE CASTS: 4 /HPF (ref 0–8)
IMMATURE GRANULOCYTES #: 0.1 K/UL
IMMATURE GRANULOCYTES #: 0.3 K/UL
IMMATURE GRANULOCYTES #: 0.3 K/UL
INR BLD: 1.07 (ref 0.88–1.18)
INR BLD: 1.09 (ref 0.88–1.18)
KETONES, URINE: NEGATIVE MG/DL
LACTIC ACID: 0.7 MMOL/L (ref 0.5–1.9)
LACTIC ACID: 2 MMOL/L (ref 0.5–1.9)
LEUKOCYTE ESTERASE, URINE: NEGATIVE
LYMPHOCYTES ABSOLUTE: 0.5 K/UL (ref 1.1–4.5)
LYMPHOCYTES ABSOLUTE: 0.7 K/UL (ref 1.1–4.5)
LYMPHOCYTES ABSOLUTE: 1 K/UL (ref 1.1–4.5)
LYMPHOCYTES RELATIVE PERCENT: 4.4 % (ref 20–40)
LYMPHOCYTES RELATIVE PERCENT: 6.1 % (ref 20–40)
LYMPHOCYTES RELATIVE PERCENT: 6.8 % (ref 20–40)
MAGNESIUM: 1.9 MG/DL (ref 1.7–2.3)
MCH RBC QN AUTO: 31.2 PG (ref 27–31)
MCH RBC QN AUTO: 31.3 PG (ref 27–31)
MCH RBC QN AUTO: 31.7 PG (ref 27–31)
MCHC RBC AUTO-ENTMCNC: 31.2 G/DL (ref 33–37)
MCHC RBC AUTO-ENTMCNC: 32.2 G/DL (ref 33–37)
MCHC RBC AUTO-ENTMCNC: 32.2 G/DL (ref 33–37)
MCV RBC AUTO: 100.3 FL (ref 81–99)
MCV RBC AUTO: 97.4 FL (ref 81–99)
MCV RBC AUTO: 98.5 FL (ref 81–99)
METHEMOGLOBIN ARTERIAL: 0.4 %
METHEMOGLOBIN ARTERIAL: 0.8 %
MONOCYTES ABSOLUTE: 0.2 K/UL (ref 0–0.9)
MONOCYTES ABSOLUTE: 1.3 K/UL (ref 0–0.9)
MONOCYTES ABSOLUTE: 1.4 K/UL (ref 0–0.9)
MONOCYTES RELATIVE PERCENT: 2.1 % (ref 0–10)
MONOCYTES RELATIVE PERCENT: 8.3 % (ref 0–10)
MONOCYTES RELATIVE PERCENT: 9.2 % (ref 0–10)
MRSA CULTURE ONLY: NORMAL
NEUTROPHILS ABSOLUTE: 11.6 K/UL (ref 1.5–7.5)
NEUTROPHILS ABSOLUTE: 12.1 K/UL (ref 1.5–7.5)
NEUTROPHILS ABSOLUTE: 7.5 K/UL (ref 1.5–7.5)
NEUTROPHILS RELATIVE PERCENT: 76 % (ref 50–65)
NEUTROPHILS RELATIVE PERCENT: 79.9 % (ref 50–65)
NEUTROPHILS RELATIVE PERCENT: 90.7 % (ref 50–65)
NITRITE, URINE: NEGATIVE
O2 CONTENT ARTERIAL: 12.7 ML/DL
O2 CONTENT ARTERIAL: 14.2 ML/DL
O2 SAT, ARTERIAL: 76.6 %
O2 SAT, ARTERIAL: 89.6 %
O2 THERAPY: ABNORMAL
O2 THERAPY: ABNORMAL
PCO2 ARTERIAL: 38 MMHG (ref 35–45)
PCO2 ARTERIAL: 39 MMHG (ref 35–45)
PDW BLD-RTO: 15.4 % (ref 11.5–14.5)
PDW BLD-RTO: 15.6 % (ref 11.5–14.5)
PDW BLD-RTO: 15.8 % (ref 11.5–14.5)
PH ARTERIAL: 7.37 (ref 7.35–7.45)
PH ARTERIAL: 7.56 (ref 7.35–7.45)
PH UA: 5.5 (ref 5–8)
PLATELET # BLD: 404 K/UL (ref 130–400)
PLATELET # BLD: 432 K/UL (ref 130–400)
PLATELET # BLD: 497 K/UL (ref 130–400)
PMV BLD AUTO: 10 FL (ref 9.4–12.3)
PMV BLD AUTO: 10 FL (ref 9.4–12.3)
PMV BLD AUTO: 10.7 FL (ref 9.4–12.3)
PO2 ARTERIAL: 37 MMHG (ref 80–100)
PO2 ARTERIAL: 52 MMHG (ref 80–100)
POTASSIUM REFLEX MAGNESIUM: 3.4 MMOL/L (ref 3.5–5)
POTASSIUM REFLEX MAGNESIUM: 4.3 MMOL/L (ref 3.5–5)
POTASSIUM SERPL-SCNC: 3.8 MMOL/L (ref 3.5–5)
POTASSIUM SERPL-SCNC: 3.8 MMOL/L (ref 3.5–5)
POTASSIUM SERPL-SCNC: 4 MMOL/L (ref 3.5–5)
POTASSIUM SERPL-SCNC: 4.2 MMOL/L (ref 3.5–5)
POTASSIUM SERPL-SCNC: 4.2 MMOL/L (ref 3.5–5)
POTASSIUM, WHOLE BLOOD: 3.1
POTASSIUM, WHOLE BLOOD: 4.1
PRO-BNP: 1899 PG/ML (ref 0–1800)
PRO-BNP: 2177 PG/ML (ref 0–1800)
PROCALCITONIN: <0.07 NG/ML
PROTEIN UA: 30 MG/DL
PROTHROMBIN TIME: 13.8 SEC (ref 12–14.6)
PROTHROMBIN TIME: 14 SEC (ref 12–14.6)
RAPID INFLUENZA  B AGN: NEGATIVE
RAPID INFLUENZA A AGN: NEGATIVE
RBC # BLD: 3.44 M/UL (ref 4.2–5.4)
RBC # BLD: 3.81 M/UL (ref 4.2–5.4)
RBC # BLD: 3.86 M/UL (ref 4.2–5.4)
RBC UA: 3 /HPF (ref 0–4)
REASON FOR REJECTION: NORMAL
REJECTED TEST: NORMAL
SODIUM BLD-SCNC: 133 MMOL/L (ref 136–145)
SODIUM BLD-SCNC: 136 MMOL/L (ref 136–145)
SODIUM BLD-SCNC: 137 MMOL/L (ref 136–145)
SODIUM BLD-SCNC: 140 MMOL/L (ref 136–145)
SODIUM BLD-SCNC: 141 MMOL/L (ref 136–145)
SPECIFIC GRAVITY UA: 1.02 (ref 1–1.03)
TOTAL PROTEIN: 5.7 G/DL (ref 6.6–8.7)
TOTAL PROTEIN: 6.7 G/DL (ref 6.6–8.7)
TROPONIN: 0.04 NG/ML (ref 0–0.03)
TROPONIN: 0.04 NG/ML (ref 0–0.03)
UROBILINOGEN, URINE: 0.2 E.U./DL
WBC # BLD: 15.2 K/UL (ref 4.8–10.8)
WBC # BLD: 15.3 K/UL (ref 4.8–10.8)
WBC # BLD: 8.2 K/UL (ref 4.8–10.8)
WBC UA: 1 /HPF (ref 0–5)

## 2020-01-01 PROCEDURE — 84484 ASSAY OF TROPONIN QUANT: CPT

## 2020-01-01 PROCEDURE — 2700000000 HC OXYGEN THERAPY PER DAY

## 2020-01-01 PROCEDURE — 84145 PROCALCITONIN (PCT): CPT

## 2020-01-01 PROCEDURE — 83735 ASSAY OF MAGNESIUM: CPT

## 2020-01-01 PROCEDURE — 2580000003 HC RX 258: Performed by: PHYSICIAN ASSISTANT

## 2020-01-01 PROCEDURE — 6360000002 HC RX W HCPCS: Performed by: EMERGENCY MEDICINE

## 2020-01-01 PROCEDURE — 6360000002 HC RX W HCPCS: Performed by: FAMILY MEDICINE

## 2020-01-01 PROCEDURE — 99221 1ST HOSP IP/OBS SF/LOW 40: CPT | Performed by: INTERNAL MEDICINE

## 2020-01-01 PROCEDURE — 2580000003 HC RX 258: Performed by: EMERGENCY MEDICINE

## 2020-01-01 PROCEDURE — 82803 BLOOD GASES ANY COMBINATION: CPT

## 2020-01-01 PROCEDURE — 36415 COLL VENOUS BLD VENIPUNCTURE: CPT

## 2020-01-01 PROCEDURE — 4040F PNEUMOC VAC/ADMIN/RCVD: CPT | Performed by: NURSE PRACTITIONER

## 2020-01-01 PROCEDURE — 87040 BLOOD CULTURE FOR BACTERIA: CPT

## 2020-01-01 PROCEDURE — 94640 AIRWAY INHALATION TREATMENT: CPT

## 2020-01-01 PROCEDURE — 87804 INFLUENZA ASSAY W/OPTIC: CPT

## 2020-01-01 PROCEDURE — 6370000000 HC RX 637 (ALT 250 FOR IP): Performed by: FAMILY MEDICINE

## 2020-01-01 PROCEDURE — 83880 ASSAY OF NATRIURETIC PEPTIDE: CPT

## 2020-01-01 PROCEDURE — 84132 ASSAY OF SERUM POTASSIUM: CPT

## 2020-01-01 PROCEDURE — 81001 URINALYSIS AUTO W/SCOPE: CPT

## 2020-01-01 PROCEDURE — G8427 DOCREV CUR MEDS BY ELIG CLIN: HCPCS | Performed by: NURSE PRACTITIONER

## 2020-01-01 PROCEDURE — 1090F PRES/ABSN URINE INCON ASSESS: CPT | Performed by: NURSE PRACTITIONER

## 2020-01-01 PROCEDURE — 99285 EMERGENCY DEPT VISIT HI MDM: CPT

## 2020-01-01 PROCEDURE — 6360000002 HC RX W HCPCS: Performed by: PHYSICIAN ASSISTANT

## 2020-01-01 PROCEDURE — 85025 COMPLETE CBC W/AUTO DIFF WBC: CPT

## 2020-01-01 PROCEDURE — 93005 ELECTROCARDIOGRAM TRACING: CPT | Performed by: EMERGENCY MEDICINE

## 2020-01-01 PROCEDURE — 2580000003 HC RX 258: Performed by: FAMILY MEDICINE

## 2020-01-01 PROCEDURE — 1210000000 HC MED SURG R&B

## 2020-01-01 PROCEDURE — 99214 OFFICE O/P EST MOD 30 MIN: CPT | Performed by: NURSE PRACTITIONER

## 2020-01-01 PROCEDURE — 2000000000 HC ICU R&B

## 2020-01-01 PROCEDURE — 80048 BASIC METABOLIC PNL TOTAL CA: CPT

## 2020-01-01 PROCEDURE — 6370000000 HC RX 637 (ALT 250 FOR IP): Performed by: EMERGENCY MEDICINE

## 2020-01-01 PROCEDURE — 85610 PROTHROMBIN TIME: CPT

## 2020-01-01 PROCEDURE — 96368 THER/DIAG CONCURRENT INF: CPT

## 2020-01-01 PROCEDURE — 1123F ACP DISCUSS/DSCN MKR DOCD: CPT | Performed by: NURSE PRACTITIONER

## 2020-01-01 PROCEDURE — 87081 CULTURE SCREEN ONLY: CPT

## 2020-01-01 PROCEDURE — 2500000003 HC RX 250 WO HCPCS: Performed by: FAMILY MEDICINE

## 2020-01-01 PROCEDURE — 80053 COMPREHEN METABOLIC PANEL: CPT

## 2020-01-01 PROCEDURE — 93010 ELECTROCARDIOGRAM REPORT: CPT | Performed by: INTERNAL MEDICINE

## 2020-01-01 PROCEDURE — 92610 EVALUATE SWALLOWING FUNCTION: CPT

## 2020-01-01 PROCEDURE — 71045 X-RAY EXAM CHEST 1 VIEW: CPT

## 2020-01-01 PROCEDURE — G8420 CALC BMI NORM PARAMETERS: HCPCS | Performed by: NURSE PRACTITIONER

## 2020-01-01 PROCEDURE — 94660 CPAP INITIATION&MGMT: CPT

## 2020-01-01 PROCEDURE — 85730 THROMBOPLASTIN TIME PARTIAL: CPT

## 2020-01-01 PROCEDURE — 6370000000 HC RX 637 (ALT 250 FOR IP): Performed by: PHYSICIAN ASSISTANT

## 2020-01-01 PROCEDURE — 99999 PR OFFICE/OUTPT VISIT,PROCEDURE ONLY: CPT | Performed by: EMERGENCY MEDICINE

## 2020-01-01 PROCEDURE — 96365 THER/PROPH/DIAG IV INF INIT: CPT

## 2020-01-01 PROCEDURE — 83605 ASSAY OF LACTIC ACID: CPT

## 2020-01-01 PROCEDURE — G8484 FLU IMMUNIZE NO ADMIN: HCPCS | Performed by: NURSE PRACTITIONER

## 2020-01-01 PROCEDURE — 96374 THER/PROPH/DIAG INJ IV PUSH: CPT

## 2020-01-01 PROCEDURE — 36600 WITHDRAWAL OF ARTERIAL BLOOD: CPT

## 2020-01-01 PROCEDURE — 1036F TOBACCO NON-USER: CPT | Performed by: NURSE PRACTITIONER

## 2020-01-01 PROCEDURE — 96375 TX/PRO/DX INJ NEW DRUG ADDON: CPT

## 2020-01-01 PROCEDURE — 94760 N-INVAS EAR/PLS OXIMETRY 1: CPT

## 2020-01-01 RX ORDER — BUSPIRONE HYDROCHLORIDE 15 MG/1
15 TABLET ORAL 2 TIMES DAILY
Status: DISCONTINUED | OUTPATIENT
Start: 2020-01-01 | End: 2020-01-01 | Stop reason: HOSPADM

## 2020-01-01 RX ORDER — M-VIT,TX,IRON,MINS/CALC/FOLIC 27MG-0.4MG
1 TABLET ORAL 2 TIMES DAILY
Status: DISCONTINUED | OUTPATIENT
Start: 2020-01-01 | End: 2020-01-01 | Stop reason: HOSPADM

## 2020-01-01 RX ORDER — FUROSEMIDE 40 MG/1
40 TABLET ORAL DAILY
Status: DISCONTINUED | OUTPATIENT
Start: 2020-01-01 | End: 2020-01-01 | Stop reason: SDUPTHER

## 2020-01-01 RX ORDER — CARVEDILOL 6.25 MG/1
6.25 TABLET ORAL 2 TIMES DAILY WITH MEALS
Status: DISCONTINUED | OUTPATIENT
Start: 2020-01-01 | End: 2020-01-01 | Stop reason: HOSPADM

## 2020-01-01 RX ORDER — PANTOPRAZOLE SODIUM 40 MG/1
40 TABLET, DELAYED RELEASE ORAL
Status: DISCONTINUED | OUTPATIENT
Start: 2020-01-01 | End: 2020-01-01 | Stop reason: HOSPADM

## 2020-01-01 RX ORDER — IPRATROPIUM BROMIDE AND ALBUTEROL SULFATE 2.5; .5 MG/3ML; MG/3ML
SOLUTION RESPIRATORY (INHALATION)
Status: DISPENSED
Start: 2020-01-01 | End: 2020-01-01

## 2020-01-01 RX ORDER — ESOMEPRAZOLE MAGNESIUM 40 MG/1
40 FOR SUSPENSION ORAL 2 TIMES DAILY
Status: DISCONTINUED | OUTPATIENT
Start: 2020-01-01 | End: 2020-01-01 | Stop reason: CLARIF

## 2020-01-01 RX ORDER — DILTIAZEM HYDROCHLORIDE 5 MG/ML
10 INJECTION INTRAVENOUS ONCE
Status: COMPLETED | OUTPATIENT
Start: 2020-01-01 | End: 2020-01-01

## 2020-01-01 RX ORDER — ASPIRIN 81 MG/1
81 TABLET, CHEWABLE ORAL DAILY
Status: DISCONTINUED | OUTPATIENT
Start: 2020-01-01 | End: 2020-01-01 | Stop reason: HOSPADM

## 2020-01-01 RX ORDER — ACETAMINOPHEN 325 MG/1
650 TABLET ORAL EVERY 4 HOURS PRN
Status: DISCONTINUED | OUTPATIENT
Start: 2020-01-01 | End: 2020-01-01 | Stop reason: HOSPADM

## 2020-01-01 RX ORDER — CYCLOBENZAPRINE HCL 10 MG
5 TABLET ORAL 3 TIMES DAILY PRN
Status: DISCONTINUED | OUTPATIENT
Start: 2020-01-01 | End: 2020-01-01 | Stop reason: HOSPADM

## 2020-01-01 RX ORDER — CARBOXYMETHYLCELLULOSE SODIUM 10 MG/ML
1 GEL OPHTHALMIC 4 TIMES DAILY
Status: DISCONTINUED | OUTPATIENT
Start: 2020-01-01 | End: 2020-01-01 | Stop reason: HOSPADM

## 2020-01-01 RX ORDER — CARVEDILOL 6.25 MG/1
6.25 TABLET ORAL 2 TIMES DAILY WITH MEALS
Status: DISCONTINUED | OUTPATIENT
Start: 2020-01-01 | End: 2020-01-01 | Stop reason: SDUPTHER

## 2020-01-01 RX ORDER — SODIUM CHLORIDE 0.9 % (FLUSH) 0.9 %
10 SYRINGE (ML) INJECTION EVERY 12 HOURS SCHEDULED
Status: DISCONTINUED | OUTPATIENT
Start: 2020-01-01 | End: 2020-01-01 | Stop reason: HOSPADM

## 2020-01-01 RX ORDER — CEFDINIR 300 MG/1
300 CAPSULE ORAL 2 TIMES DAILY
Qty: 10 CAPSULE | Refills: 0 | Status: SHIPPED | OUTPATIENT
Start: 2020-01-01 | End: 2020-01-01

## 2020-01-01 RX ORDER — MORPHINE SULFATE 4 MG/ML
0.5 INJECTION, SOLUTION INTRAMUSCULAR; INTRAVENOUS EVERY 4 HOURS PRN
Status: DISCONTINUED | OUTPATIENT
Start: 2020-01-01 | End: 2020-01-01

## 2020-01-01 RX ORDER — METHYLPREDNISOLONE SODIUM SUCCINATE 125 MG/2ML
80 INJECTION, POWDER, LYOPHILIZED, FOR SOLUTION INTRAMUSCULAR; INTRAVENOUS ONCE
Status: COMPLETED | OUTPATIENT
Start: 2020-01-01 | End: 2020-01-01

## 2020-01-01 RX ORDER — ASPIRIN 81 MG/1
81 TABLET, CHEWABLE ORAL DAILY
Status: DISCONTINUED | OUTPATIENT
Start: 2020-01-01 | End: 2020-01-01 | Stop reason: SDUPTHER

## 2020-01-01 RX ORDER — POTASSIUM CHLORIDE 20 MEQ/1
20 TABLET, EXTENDED RELEASE ORAL ONCE
Status: DISCONTINUED | OUTPATIENT
Start: 2020-01-01 | End: 2020-01-01 | Stop reason: HOSPADM

## 2020-01-01 RX ORDER — PANTOPRAZOLE SODIUM 40 MG/1
40 TABLET, DELAYED RELEASE ORAL
Qty: 30 TABLET | Refills: 3 | Status: SHIPPED | OUTPATIENT
Start: 2020-01-01

## 2020-01-01 RX ORDER — ALBUTEROL SULFATE 2.5 MG/3ML
2.5 SOLUTION RESPIRATORY (INHALATION) EVERY 6 HOURS PRN
Status: DISCONTINUED | OUTPATIENT
Start: 2020-01-01 | End: 2020-01-01 | Stop reason: HOSPADM

## 2020-01-01 RX ORDER — FUROSEMIDE 40 MG/1
40 TABLET ORAL DAILY
Status: DISCONTINUED | OUTPATIENT
Start: 2020-01-01 | End: 2020-01-01 | Stop reason: HOSPADM

## 2020-01-01 RX ORDER — SODIUM CHLORIDE 0.9 % (FLUSH) 0.9 %
10 SYRINGE (ML) INJECTION EVERY 12 HOURS SCHEDULED
Status: DISCONTINUED | OUTPATIENT
Start: 2020-01-01 | End: 2020-01-01

## 2020-01-01 RX ORDER — OXYCODONE AND ACETAMINOPHEN 10; 325 MG/1; MG/1
1 TABLET ORAL EVERY 6 HOURS PRN
Status: DISCONTINUED | OUTPATIENT
Start: 2020-01-01 | End: 2020-01-01 | Stop reason: HOSPADM

## 2020-01-01 RX ORDER — SODIUM CHLORIDE 0.9 % (FLUSH) 0.9 %
10 SYRINGE (ML) INJECTION PRN
Status: DISCONTINUED | OUTPATIENT
Start: 2020-01-01 | End: 2020-01-01 | Stop reason: HOSPADM

## 2020-01-01 RX ORDER — ALBUTEROL SULFATE 90 UG/1
2 AEROSOL, METERED RESPIRATORY (INHALATION) EVERY 6 HOURS PRN
Status: DISCONTINUED | OUTPATIENT
Start: 2020-01-01 | End: 2020-01-01 | Stop reason: CLARIF

## 2020-01-01 RX ORDER — METHYLPREDNISOLONE SODIUM SUCCINATE 40 MG/ML
40 INJECTION, POWDER, LYOPHILIZED, FOR SOLUTION INTRAMUSCULAR; INTRAVENOUS EVERY 6 HOURS
Status: DISCONTINUED | OUTPATIENT
Start: 2020-01-01 | End: 2020-01-01

## 2020-01-01 RX ORDER — PANTOPRAZOLE SODIUM 40 MG/1
40 TABLET, DELAYED RELEASE ORAL 2 TIMES DAILY
Status: DISCONTINUED | OUTPATIENT
Start: 2020-01-01 | End: 2020-01-01 | Stop reason: HOSPADM

## 2020-01-01 RX ORDER — SODIUM CHLORIDE 0.9 % (FLUSH) 0.9 %
10 SYRINGE (ML) INJECTION PRN
Status: DISCONTINUED | OUTPATIENT
Start: 2020-01-01 | End: 2020-01-01

## 2020-01-01 RX ORDER — FUROSEMIDE 10 MG/ML
20 INJECTION INTRAMUSCULAR; INTRAVENOUS ONCE
Status: COMPLETED | OUTPATIENT
Start: 2020-01-01 | End: 2020-01-01

## 2020-01-01 RX ORDER — FUROSEMIDE 10 MG/ML
60 INJECTION INTRAMUSCULAR; INTRAVENOUS ONCE
Status: COMPLETED | OUTPATIENT
Start: 2020-01-01 | End: 2020-01-01

## 2020-01-01 RX ORDER — SUCRALFATE 1 G/1
1 TABLET ORAL 4 TIMES DAILY
Status: DISCONTINUED | OUTPATIENT
Start: 2020-01-01 | End: 2020-01-01 | Stop reason: HOSPADM

## 2020-01-01 RX ORDER — CEFEPIME HYDROCHLORIDE 2 G/50ML
2 INJECTION, SOLUTION INTRAVENOUS EVERY 12 HOURS
Status: DISCONTINUED | OUTPATIENT
Start: 2020-01-01 | End: 2020-01-01 | Stop reason: SDUPTHER

## 2020-01-01 RX ORDER — CYCLOBENZAPRINE HCL 5 MG
5 TABLET ORAL 3 TIMES DAILY PRN
Status: DISCONTINUED | OUTPATIENT
Start: 2020-01-01 | End: 2020-01-01 | Stop reason: HOSPADM

## 2020-01-01 RX ORDER — SUCRALFATE 1 G/1
1 TABLET ORAL
Status: DISCONTINUED | OUTPATIENT
Start: 2020-01-01 | End: 2020-01-01 | Stop reason: HOSPADM

## 2020-01-01 RX ORDER — IPRATROPIUM BROMIDE AND ALBUTEROL SULFATE 2.5; .5 MG/3ML; MG/3ML
1 SOLUTION RESPIRATORY (INHALATION) ONCE
Status: COMPLETED | OUTPATIENT
Start: 2020-01-01 | End: 2020-01-01

## 2020-01-01 RX ORDER — IPRATROPIUM BROMIDE AND ALBUTEROL SULFATE 2.5; .5 MG/3ML; MG/3ML
1 SOLUTION RESPIRATORY (INHALATION) 4 TIMES DAILY
Status: DISCONTINUED | OUTPATIENT
Start: 2020-01-01 | End: 2020-01-01 | Stop reason: HOSPADM

## 2020-01-01 RX ORDER — METHYLPREDNISOLONE SODIUM SUCCINATE 40 MG/ML
40 INJECTION, POWDER, LYOPHILIZED, FOR SOLUTION INTRAMUSCULAR; INTRAVENOUS EVERY 8 HOURS
Status: DISCONTINUED | OUTPATIENT
Start: 2020-01-01 | End: 2020-01-01 | Stop reason: HOSPADM

## 2020-01-01 RX ORDER — HYDROXYZINE HYDROCHLORIDE 10 MG/1
10 TABLET, FILM COATED ORAL 3 TIMES DAILY PRN
Status: DISCONTINUED | OUTPATIENT
Start: 2020-01-01 | End: 2020-01-01 | Stop reason: HOSPADM

## 2020-01-01 RX ORDER — METHYLPREDNISOLONE SODIUM SUCCINATE 40 MG/ML
40 INJECTION, POWDER, LYOPHILIZED, FOR SOLUTION INTRAMUSCULAR; INTRAVENOUS EVERY 6 HOURS
Status: DISCONTINUED | OUTPATIENT
Start: 2020-01-01 | End: 2020-01-01 | Stop reason: HOSPADM

## 2020-01-01 RX ORDER — MORPHINE SULFATE 4 MG/ML
0.5 INJECTION, SOLUTION INTRAMUSCULAR; INTRAVENOUS EVERY 4 HOURS PRN
Status: DISCONTINUED | OUTPATIENT
Start: 2020-01-01 | End: 2020-01-01 | Stop reason: HOSPADM

## 2020-01-01 RX ORDER — AMLODIPINE BESYLATE 5 MG/1
5 TABLET ORAL 2 TIMES DAILY
Qty: 180 TABLET | Refills: 3 | Status: ON HOLD | OUTPATIENT
Start: 2020-01-01 | End: 2020-01-01 | Stop reason: HOSPADM

## 2020-01-01 RX ADMIN — METHYLPREDNISOLONE SODIUM SUCCINATE 80 MG: 125 INJECTION, POWDER, FOR SOLUTION INTRAMUSCULAR; INTRAVENOUS at 09:46

## 2020-01-01 RX ADMIN — CARBOXYMETHYLCELLULOSE SODIUM 1 DROP: 10 GEL OPHTHALMIC at 19:55

## 2020-01-01 RX ADMIN — SUCRALFATE 1 G: 1 TABLET ORAL at 06:41

## 2020-01-01 RX ADMIN — AZITHROMYCIN MONOHYDRATE 500 MG: 500 INJECTION, POWDER, LYOPHILIZED, FOR SOLUTION INTRAVENOUS at 10:54

## 2020-01-01 RX ADMIN — OXYCODONE HYDROCHLORIDE AND ACETAMINOPHEN 1 TABLET: 10; 325 TABLET ORAL at 09:18

## 2020-01-01 RX ADMIN — METHYLPREDNISOLONE SODIUM SUCCINATE 40 MG: 40 INJECTION, POWDER, FOR SOLUTION INTRAMUSCULAR; INTRAVENOUS at 06:09

## 2020-01-01 RX ADMIN — OXYCODONE HYDROCHLORIDE AND ACETAMINOPHEN 1 TABLET: 10; 325 TABLET ORAL at 00:26

## 2020-01-01 RX ADMIN — SODIUM CHLORIDE, PRESERVATIVE FREE 1 G: 5 INJECTION INTRAVENOUS at 10:54

## 2020-01-01 RX ADMIN — SUCRALFATE 1 G: 1 TABLET ORAL at 11:45

## 2020-01-01 RX ADMIN — IPRATROPIUM BROMIDE AND ALBUTEROL SULFATE 1 AMPULE: .5; 3 SOLUTION RESPIRATORY (INHALATION) at 14:13

## 2020-01-01 RX ADMIN — IPRATROPIUM BROMIDE AND ALBUTEROL SULFATE 1 AMPULE: .5; 3 SOLUTION RESPIRATORY (INHALATION) at 14:32

## 2020-01-01 RX ADMIN — CARBOXYMETHYLCELLULOSE SODIUM 1 DROP: 10 GEL OPHTHALMIC at 18:45

## 2020-01-01 RX ADMIN — OXYCODONE HYDROCHLORIDE AND ACETAMINOPHEN 1 TABLET: 10; 325 TABLET ORAL at 22:35

## 2020-01-01 RX ADMIN — CARBOXYMETHYLCELLULOSE SODIUM 1 DROP: 10 GEL OPHTHALMIC at 18:22

## 2020-01-01 RX ADMIN — BUSPIRONE HYDROCHLORIDE 15 MG: 15 TABLET ORAL at 21:38

## 2020-01-01 RX ADMIN — IPRATROPIUM BROMIDE AND ALBUTEROL SULFATE 1 AMPULE: .5; 3 SOLUTION RESPIRATORY (INHALATION) at 10:40

## 2020-01-01 RX ADMIN — BUSPIRONE HYDROCHLORIDE 15 MG: 15 TABLET ORAL at 09:24

## 2020-01-01 RX ADMIN — OXYCODONE HYDROCHLORIDE AND ACETAMINOPHEN 1 TABLET: 10; 325 TABLET ORAL at 21:51

## 2020-01-01 RX ADMIN — IPRATROPIUM BROMIDE AND ALBUTEROL SULFATE 1 AMPULE: .5; 3 SOLUTION RESPIRATORY (INHALATION) at 10:32

## 2020-01-01 RX ADMIN — CARVEDILOL 6.25 MG: 6.25 TABLET, FILM COATED ORAL at 18:44

## 2020-01-01 RX ADMIN — BUSPIRONE HYDROCHLORIDE 15 MG: 15 TABLET ORAL at 08:55

## 2020-01-01 RX ADMIN — AZITHROMYCIN MONOHYDRATE 500 MG: 500 INJECTION, POWDER, LYOPHILIZED, FOR SOLUTION INTRAVENOUS at 10:13

## 2020-01-01 RX ADMIN — OXYCODONE HYDROCHLORIDE AND ACETAMINOPHEN 1 TABLET: 10; 325 TABLET ORAL at 06:46

## 2020-01-01 RX ADMIN — IPRATROPIUM BROMIDE AND ALBUTEROL SULFATE 1 AMPULE: .5; 3 SOLUTION RESPIRATORY (INHALATION) at 20:03

## 2020-01-01 RX ADMIN — CARBOXYMETHYLCELLULOSE SODIUM 1 DROP: 10 GEL OPHTHALMIC at 08:48

## 2020-01-01 RX ADMIN — ENOXAPARIN SODIUM 30 MG: 30 INJECTION SUBCUTANEOUS at 14:29

## 2020-01-01 RX ADMIN — METHYLPREDNISOLONE SODIUM SUCCINATE 40 MG: 40 INJECTION, POWDER, FOR SOLUTION INTRAMUSCULAR; INTRAVENOUS at 21:47

## 2020-01-01 RX ADMIN — IPRATROPIUM BROMIDE AND ALBUTEROL SULFATE 1 AMPULE: .5; 3 SOLUTION RESPIRATORY (INHALATION) at 18:27

## 2020-01-01 RX ADMIN — SUCRALFATE 1 G: 1 TABLET ORAL at 23:14

## 2020-01-01 RX ADMIN — ENOXAPARIN SODIUM 30 MG: 30 INJECTION SUBCUTANEOUS at 15:05

## 2020-01-01 RX ADMIN — OXYCODONE HYDROCHLORIDE AND ACETAMINOPHEN 1 TABLET: 10; 325 TABLET ORAL at 07:54

## 2020-01-01 RX ADMIN — OXYCODONE HYDROCHLORIDE AND ACETAMINOPHEN 1 TABLET: 10; 325 TABLET ORAL at 21:13

## 2020-01-01 RX ADMIN — METHYLPREDNISOLONE SODIUM SUCCINATE 40 MG: 40 INJECTION, POWDER, FOR SOLUTION INTRAMUSCULAR; INTRAVENOUS at 15:10

## 2020-01-01 RX ADMIN — METHYLPREDNISOLONE SODIUM SUCCINATE 40 MG: 40 INJECTION, POWDER, FOR SOLUTION INTRAMUSCULAR; INTRAVENOUS at 23:13

## 2020-01-01 RX ADMIN — MORPHINE SULFATE 0.52 MG: 4 INJECTION, SOLUTION INTRAMUSCULAR; INTRAVENOUS at 08:55

## 2020-01-01 RX ADMIN — METHYLPREDNISOLONE SODIUM SUCCINATE 40 MG: 40 INJECTION, POWDER, FOR SOLUTION INTRAMUSCULAR; INTRAVENOUS at 13:22

## 2020-01-01 RX ADMIN — OXYCODONE HYDROCHLORIDE AND ACETAMINOPHEN 1 TABLET: 10; 325 TABLET ORAL at 15:05

## 2020-01-01 RX ADMIN — SODIUM CHLORIDE, PRESERVATIVE FREE 1 G: 5 INJECTION INTRAVENOUS at 10:13

## 2020-01-01 RX ADMIN — CARVEDILOL 6.25 MG: 6.25 TABLET, FILM COATED ORAL at 16:23

## 2020-01-01 RX ADMIN — ACETAMINOPHEN 650 MG: 325 TABLET ORAL at 08:35

## 2020-01-01 RX ADMIN — IPRATROPIUM BROMIDE AND ALBUTEROL SULFATE 1 AMPULE: .5; 3 SOLUTION RESPIRATORY (INHALATION) at 09:42

## 2020-01-01 RX ADMIN — OXYCODONE HYDROCHLORIDE AND ACETAMINOPHEN 1 TABLET: 10; 325 TABLET ORAL at 16:47

## 2020-01-01 RX ADMIN — SUCRALFATE 1 G: 1 TABLET ORAL at 18:44

## 2020-01-01 RX ADMIN — SUCRALFATE 1 G: 1 TABLET ORAL at 18:22

## 2020-01-01 RX ADMIN — MORPHINE SULFATE 0.52 MG: 4 INJECTION, SOLUTION INTRAMUSCULAR; INTRAVENOUS at 20:00

## 2020-01-01 RX ADMIN — FUROSEMIDE 40 MG: 40 TABLET ORAL at 08:35

## 2020-01-01 RX ADMIN — ENOXAPARIN SODIUM 30 MG: 30 INJECTION SUBCUTANEOUS at 09:58

## 2020-01-01 RX ADMIN — FUROSEMIDE 40 MG: 40 TABLET ORAL at 09:17

## 2020-01-01 RX ADMIN — SODIUM CHLORIDE, PRESERVATIVE FREE 10 ML: 5 INJECTION INTRAVENOUS at 08:36

## 2020-01-01 RX ADMIN — ASPIRIN 81 MG 81 MG: 81 TABLET ORAL at 09:24

## 2020-01-01 RX ADMIN — CARVEDILOL 6.25 MG: 6.25 TABLET, FILM COATED ORAL at 08:35

## 2020-01-01 RX ADMIN — ASPIRIN 81 MG 81 MG: 81 TABLET ORAL at 09:17

## 2020-01-01 RX ADMIN — SODIUM CHLORIDE, PRESERVATIVE FREE 10 ML: 5 INJECTION INTRAVENOUS at 21:22

## 2020-01-01 RX ADMIN — METHYLPREDNISOLONE SODIUM SUCCINATE 40 MG: 40 INJECTION, POWDER, FOR SOLUTION INTRAMUSCULAR; INTRAVENOUS at 22:26

## 2020-01-01 RX ADMIN — IPRATROPIUM BROMIDE AND ALBUTEROL SULFATE 1 AMPULE: .5; 3 SOLUTION RESPIRATORY (INHALATION) at 19:58

## 2020-01-01 RX ADMIN — SODIUM CHLORIDE, PRESERVATIVE FREE 10 ML: 5 INJECTION INTRAVENOUS at 20:55

## 2020-01-01 RX ADMIN — CARBOXYMETHYLCELLULOSE SODIUM 1 DROP: 10 GEL OPHTHALMIC at 23:14

## 2020-01-01 RX ADMIN — ASPIRIN 81 MG 81 MG: 81 TABLET ORAL at 08:35

## 2020-01-01 RX ADMIN — FUROSEMIDE 20 MG: 10 INJECTION, SOLUTION INTRAMUSCULAR; INTRAVENOUS at 17:36

## 2020-01-01 RX ADMIN — SUCRALFATE 1 G: 1 TABLET ORAL at 07:41

## 2020-01-01 RX ADMIN — MORPHINE SULFATE 0.52 MG: 4 INJECTION, SOLUTION INTRAMUSCULAR; INTRAVENOUS at 13:21

## 2020-01-01 RX ADMIN — IPRATROPIUM BROMIDE AND ALBUTEROL SULFATE 1 AMPULE: .5; 3 SOLUTION RESPIRATORY (INHALATION) at 06:53

## 2020-01-01 RX ADMIN — IPRATROPIUM BROMIDE AND ALBUTEROL SULFATE 1 AMPULE: .5; 3 SOLUTION RESPIRATORY (INHALATION) at 10:36

## 2020-01-01 RX ADMIN — IPRATROPIUM BROMIDE AND ALBUTEROL SULFATE 1 AMPULE: .5; 3 SOLUTION RESPIRATORY (INHALATION) at 07:06

## 2020-01-01 RX ADMIN — SUCRALFATE 1 G: 1 TABLET ORAL at 16:23

## 2020-01-01 RX ADMIN — AZITHROMYCIN MONOHYDRATE 500 MG: 500 INJECTION, POWDER, LYOPHILIZED, FOR SOLUTION INTRAVENOUS at 08:42

## 2020-01-01 RX ADMIN — IPRATROPIUM BROMIDE AND ALBUTEROL SULFATE 1 AMPULE: .5; 3 SOLUTION RESPIRATORY (INHALATION) at 14:39

## 2020-01-01 RX ADMIN — SUCRALFATE 1 G: 1 TABLET ORAL at 09:24

## 2020-01-01 RX ADMIN — SODIUM CHLORIDE, PRESERVATIVE FREE 10 ML: 5 INJECTION INTRAVENOUS at 10:13

## 2020-01-01 RX ADMIN — CARVEDILOL 6.25 MG: 6.25 TABLET, FILM COATED ORAL at 09:24

## 2020-01-01 RX ADMIN — PANTOPRAZOLE SODIUM 40 MG: 40 TABLET, DELAYED RELEASE ORAL at 06:08

## 2020-01-01 RX ADMIN — DILTIAZEM HYDROCHLORIDE 10 MG/HR: 5 INJECTION INTRAVENOUS at 19:09

## 2020-01-01 RX ADMIN — MORPHINE SULFATE 0.52 MG: 4 INJECTION, SOLUTION INTRAMUSCULAR; INTRAVENOUS at 12:35

## 2020-01-01 RX ADMIN — CARBOXYMETHYLCELLULOSE SODIUM 1 DROP: 10 GEL OPHTHALMIC at 09:25

## 2020-01-01 RX ADMIN — METHYLPREDNISOLONE SODIUM SUCCINATE 40 MG: 40 INJECTION, POWDER, FOR SOLUTION INTRAMUSCULAR; INTRAVENOUS at 06:11

## 2020-01-01 RX ADMIN — FUROSEMIDE 60 MG: 10 INJECTION, SOLUTION INTRAMUSCULAR; INTRAVENOUS at 11:27

## 2020-01-01 RX ADMIN — Medication 500 MG: at 22:27

## 2020-01-01 RX ADMIN — IPRATROPIUM BROMIDE AND ALBUTEROL SULFATE 1 AMPULE: .5; 3 SOLUTION RESPIRATORY (INHALATION) at 06:08

## 2020-01-01 RX ADMIN — IPRATROPIUM BROMIDE AND ALBUTEROL SULFATE 1 AMPULE: .5; 3 SOLUTION RESPIRATORY (INHALATION) at 14:35

## 2020-01-01 RX ADMIN — IPRATROPIUM BROMIDE AND ALBUTEROL SULFATE 1 AMPULE: .5; 3 SOLUTION RESPIRATORY (INHALATION) at 18:21

## 2020-01-01 RX ADMIN — SODIUM CHLORIDE, PRESERVATIVE FREE 1 G: 5 INJECTION INTRAVENOUS at 09:24

## 2020-01-01 RX ADMIN — IPRATROPIUM BROMIDE AND ALBUTEROL SULFATE 1 AMPULE: .5; 3 SOLUTION RESPIRATORY (INHALATION) at 10:11

## 2020-01-01 RX ADMIN — METHYLPREDNISOLONE SODIUM SUCCINATE 40 MG: 40 INJECTION, POWDER, FOR SOLUTION INTRAMUSCULAR; INTRAVENOUS at 06:03

## 2020-01-01 RX ADMIN — CARVEDILOL 6.25 MG: 6.25 TABLET, FILM COATED ORAL at 08:45

## 2020-01-01 RX ADMIN — PANTOPRAZOLE SODIUM 40 MG: 40 TABLET, DELAYED RELEASE ORAL at 16:23

## 2020-01-01 RX ADMIN — PANTOPRAZOLE SODIUM 40 MG: 40 TABLET, DELAYED RELEASE ORAL at 06:41

## 2020-01-01 RX ADMIN — ENOXAPARIN SODIUM 30 MG: 30 INJECTION SUBCUTANEOUS at 15:45

## 2020-01-01 RX ADMIN — SODIUM CHLORIDE, PRESERVATIVE FREE 2 G: 5 INJECTION INTRAVENOUS at 10:00

## 2020-01-01 RX ADMIN — BUSPIRONE HYDROCHLORIDE 15 MG: 15 TABLET ORAL at 19:55

## 2020-01-01 RX ADMIN — CARVEDILOL 6.25 MG: 6.25 TABLET, FILM COATED ORAL at 09:17

## 2020-01-01 RX ADMIN — PANTOPRAZOLE SODIUM 40 MG: 40 TABLET, DELAYED RELEASE ORAL at 18:22

## 2020-01-01 RX ADMIN — DEXTROSE MONOHYDRATE 1000 MG: 50 INJECTION, SOLUTION INTRAVENOUS at 23:54

## 2020-01-01 RX ADMIN — SUCRALFATE 1 G: 1 TABLET ORAL at 10:54

## 2020-01-01 RX ADMIN — CARBOXYMETHYLCELLULOSE SODIUM 1 DROP: 10 GEL OPHTHALMIC at 09:19

## 2020-01-01 RX ADMIN — METHYLPREDNISOLONE SODIUM SUCCINATE 40 MG: 40 INJECTION, POWDER, FOR SOLUTION INTRAMUSCULAR; INTRAVENOUS at 06:41

## 2020-01-01 RX ADMIN — FUROSEMIDE 40 MG: 40 TABLET ORAL at 08:45

## 2020-01-01 RX ADMIN — METHYLPREDNISOLONE SODIUM SUCCINATE 40 MG: 40 INJECTION, POWDER, FOR SOLUTION INTRAMUSCULAR; INTRAVENOUS at 14:29

## 2020-01-01 RX ADMIN — BUSPIRONE HYDROCHLORIDE 15 MG: 15 TABLET ORAL at 23:13

## 2020-01-01 RX ADMIN — SUCRALFATE 1 G: 1 TABLET ORAL at 19:55

## 2020-01-01 RX ADMIN — METHYLPREDNISOLONE SODIUM SUCCINATE 40 MG: 40 INJECTION, POWDER, FOR SOLUTION INTRAMUSCULAR; INTRAVENOUS at 15:05

## 2020-01-01 RX ADMIN — ENOXAPARIN SODIUM 30 MG: 30 INJECTION SUBCUTANEOUS at 14:07

## 2020-01-01 RX ADMIN — METHYLPREDNISOLONE SODIUM SUCCINATE 40 MG: 40 INJECTION, POWDER, FOR SOLUTION INTRAMUSCULAR; INTRAVENOUS at 00:26

## 2020-01-01 RX ADMIN — MORPHINE SULFATE 0.52 MG: 4 INJECTION, SOLUTION INTRAMUSCULAR; INTRAVENOUS at 00:37

## 2020-01-01 RX ADMIN — CEFTRIAXONE SODIUM 1 G: 1 INJECTION, POWDER, FOR SOLUTION INTRAMUSCULAR; INTRAVENOUS at 10:54

## 2020-01-01 RX ADMIN — SODIUM CHLORIDE, PRESERVATIVE FREE 1 G: 5 INJECTION INTRAVENOUS at 11:00

## 2020-01-01 RX ADMIN — CYCLOBENZAPRINE HYDROCHLORIDE 5 MG: 5 TABLET, FILM COATED ORAL at 23:31

## 2020-01-01 RX ADMIN — PANTOPRAZOLE SODIUM 40 MG: 40 TABLET, DELAYED RELEASE ORAL at 15:05

## 2020-01-01 RX ADMIN — CARVEDILOL 6.25 MG: 6.25 TABLET, FILM COATED ORAL at 18:22

## 2020-01-01 RX ADMIN — PANTOPRAZOLE SODIUM 40 MG: 40 TABLET, DELAYED RELEASE ORAL at 07:41

## 2020-01-01 RX ADMIN — AZITHROMYCIN MONOHYDRATE 500 MG: 500 INJECTION, POWDER, LYOPHILIZED, FOR SOLUTION INTRAVENOUS at 09:24

## 2020-01-01 RX ADMIN — IPRATROPIUM BROMIDE AND ALBUTEROL SULFATE 1 AMPULE: .5; 3 SOLUTION RESPIRATORY (INHALATION) at 07:08

## 2020-01-01 RX ADMIN — Medication 500 MG: at 10:55

## 2020-01-01 RX ADMIN — DILTIAZEM HYDROCHLORIDE 10 MG: 5 INJECTION INTRAVENOUS at 19:07

## 2020-01-01 RX ADMIN — IPRATROPIUM BROMIDE AND ALBUTEROL SULFATE 1 AMPULE: .5; 3 SOLUTION RESPIRATORY (INHALATION) at 15:19

## 2020-01-01 RX ADMIN — CARBOXYMETHYLCELLULOSE SODIUM 1 DROP: 10 GEL OPHTHALMIC at 16:28

## 2020-01-01 RX ADMIN — SODIUM CHLORIDE, PRESERVATIVE FREE 1 G: 5 INJECTION INTRAVENOUS at 08:42

## 2020-01-01 RX ADMIN — SUCRALFATE 1 G: 1 TABLET ORAL at 06:08

## 2020-01-01 RX ADMIN — CYCLOBENZAPRINE HYDROCHLORIDE 5 MG: 5 TABLET, FILM COATED ORAL at 22:35

## 2020-01-01 RX ADMIN — SUCRALFATE 1 G: 1 TABLET ORAL at 21:38

## 2020-01-01 RX ADMIN — METHYLPREDNISOLONE SODIUM SUCCINATE 40 MG: 40 INJECTION, POWDER, FOR SOLUTION INTRAMUSCULAR; INTRAVENOUS at 17:35

## 2020-01-01 RX ADMIN — BUSPIRONE HYDROCHLORIDE 15 MG: 15 TABLET ORAL at 09:18

## 2020-01-01 RX ADMIN — METHYLPREDNISOLONE SODIUM SUCCINATE 40 MG: 40 INJECTION, POWDER, FOR SOLUTION INTRAMUSCULAR; INTRAVENOUS at 00:37

## 2020-01-01 RX ADMIN — METHYLPREDNISOLONE SODIUM SUCCINATE 40 MG: 40 INJECTION, POWDER, FOR SOLUTION INTRAMUSCULAR; INTRAVENOUS at 14:07

## 2020-01-01 RX ADMIN — FUROSEMIDE 40 MG: 40 TABLET ORAL at 09:24

## 2020-01-01 RX ADMIN — CARBOXYMETHYLCELLULOSE SODIUM 1 DROP: 10 GEL OPHTHALMIC at 21:39

## 2020-01-01 RX ADMIN — METHYLPREDNISOLONE SODIUM SUCCINATE 40 MG: 40 INJECTION, POWDER, FOR SOLUTION INTRAMUSCULAR; INTRAVENOUS at 18:21

## 2020-01-01 RX ADMIN — IPRATROPIUM BROMIDE AND ALBUTEROL SULFATE 1 AMPULE: .5; 3 SOLUTION RESPIRATORY (INHALATION) at 20:26

## 2020-01-01 RX ADMIN — SODIUM CHLORIDE, PRESERVATIVE FREE 2 G: 5 INJECTION INTRAVENOUS at 22:20

## 2020-01-01 RX ADMIN — ENOXAPARIN SODIUM 30 MG: 30 INJECTION SUBCUTANEOUS at 15:10

## 2020-01-01 RX ADMIN — SODIUM CHLORIDE, PRESERVATIVE FREE 10 ML: 5 INJECTION INTRAVENOUS at 09:58

## 2020-01-01 RX ADMIN — ASPIRIN 81 MG 81 MG: 81 TABLET ORAL at 08:45

## 2020-01-01 RX ADMIN — IPRATROPIUM BROMIDE AND ALBUTEROL SULFATE 1 AMPULE: .5; 3 SOLUTION RESPIRATORY (INHALATION) at 10:29

## 2020-01-01 RX ADMIN — IPRATROPIUM BROMIDE AND ALBUTEROL SULFATE 1 AMPULE: .5; 3 SOLUTION RESPIRATORY (INHALATION) at 06:17

## 2020-01-01 RX ADMIN — CARBOXYMETHYLCELLULOSE SODIUM 1 DROP: 10 GEL OPHTHALMIC at 12:37

## 2020-01-01 RX ADMIN — METHYLPREDNISOLONE SODIUM SUCCINATE 40 MG: 40 INJECTION, POWDER, FOR SOLUTION INTRAMUSCULAR; INTRAVENOUS at 07:41

## 2020-01-01 ASSESSMENT — PAIN SCALES - GENERAL
PAINLEVEL_OUTOF10: 7
PAINLEVEL_OUTOF10: 4
PAINLEVEL_OUTOF10: 2
PAINLEVEL_OUTOF10: 6
PAINLEVEL_OUTOF10: 1
PAINLEVEL_OUTOF10: 8
PAINLEVEL_OUTOF10: 7
PAINLEVEL_OUTOF10: 6
PAINLEVEL_OUTOF10: 0
PAINLEVEL_OUTOF10: 0
PAINLEVEL_OUTOF10: 10
PAINLEVEL_OUTOF10: 8
PAINLEVEL_OUTOF10: 7
PAINLEVEL_OUTOF10: 9
PAINLEVEL_OUTOF10: 7
PAINLEVEL_OUTOF10: 9
PAINLEVEL_OUTOF10: 0
PAINLEVEL_OUTOF10: 7
PAINLEVEL_OUTOF10: 0
PAINLEVEL_OUTOF10: 6
PAINLEVEL_OUTOF10: 6
PAINLEVEL_OUTOF10: 2
PAINLEVEL_OUTOF10: 4
PAINLEVEL_OUTOF10: 0
PAINLEVEL_OUTOF10: 5
PAINLEVEL_OUTOF10: 5

## 2020-01-01 ASSESSMENT — PAIN DESCRIPTION - DESCRIPTORS
DESCRIPTORS: ACHING;DISCOMFORT
DESCRIPTORS: CRAMPING;ACHING
DESCRIPTORS: ACHING;CONSTANT
DESCRIPTORS: ACHING;CONSTANT
DESCRIPTORS: DISCOMFORT;ACHING

## 2020-01-01 ASSESSMENT — ENCOUNTER SYMPTOMS
SHORTNESS OF BREATH: 0
RHINORRHEA: 0
SHORTNESS OF BREATH: 1
SHORTNESS OF BREATH: 1
VOMITING: 0
RHINORRHEA: 0
EYE PAIN: 0
COUGH: 0
VOICE CHANGE: 0
EYE REDNESS: 0
WHEEZING: 1
EYE REDNESS: 0
COUGH: 1
EYE PAIN: 0
DIARRHEA: 0
VOICE CHANGE: 0
COUGH: 0
ABDOMINAL PAIN: 0
VOMITING: 0
DIARRHEA: 0
ABDOMINAL PAIN: 0

## 2020-01-01 ASSESSMENT — PAIN DESCRIPTION - FREQUENCY
FREQUENCY: INTERMITTENT
FREQUENCY: INTERMITTENT
FREQUENCY: CONTINUOUS
FREQUENCY: CONTINUOUS
FREQUENCY: INTERMITTENT
FREQUENCY: INTERMITTENT

## 2020-01-01 ASSESSMENT — PAIN DESCRIPTION - LOCATION
LOCATION: SHOULDER
LOCATION: GENERALIZED
LOCATION: NECK;SHOULDER
LOCATION: CHEST;BACK
LOCATION: CHEST;HIP
LOCATION: HIP;NECK
LOCATION: HIP

## 2020-01-01 ASSESSMENT — PAIN DESCRIPTION - PROGRESSION
CLINICAL_PROGRESSION: GRADUALLY WORSENING
CLINICAL_PROGRESSION: NOT CHANGED
CLINICAL_PROGRESSION: GRADUALLY WORSENING

## 2020-01-01 ASSESSMENT — PAIN DESCRIPTION - ORIENTATION
ORIENTATION: MID
ORIENTATION: RIGHT;LEFT
ORIENTATION: MID
ORIENTATION: RIGHT;LEFT;MID

## 2020-01-01 ASSESSMENT — PAIN DESCRIPTION - PAIN TYPE
TYPE: CHRONIC PAIN
TYPE: CHRONIC PAIN;ACUTE PAIN
TYPE: CHRONIC PAIN
TYPE: CHRONIC PAIN;ACUTE PAIN
TYPE: ACUTE PAIN;CHRONIC PAIN
TYPE: CHRONIC PAIN
TYPE: CHRONIC PAIN
TYPE: CHRONIC PAIN;ACUTE PAIN

## 2020-01-01 ASSESSMENT — PAIN - FUNCTIONAL ASSESSMENT
PAIN_FUNCTIONAL_ASSESSMENT: PREVENTS OR INTERFERES WITH MANY ACTIVE NOT PASSIVE ACTIVITIES
PAIN_FUNCTIONAL_ASSESSMENT: PREVENTS OR INTERFERES SOME ACTIVE ACTIVITIES AND ADLS

## 2020-01-01 ASSESSMENT — PAIN DESCRIPTION - ONSET
ONSET: ON-GOING
ONSET: ON-GOING
ONSET: GRADUAL
ONSET: ON-GOING

## 2020-02-17 NOTE — PROGRESS NOTES
[facility] Cardiology   Established Patient Office Visit   Katherine Jurado. [id number] [phone]        OFFICE VISIT:  2020    Denny Duff - : 1920    Reason For Visit:  Romario Pantoja is a 80 y.o. female who is here for Follow-up (no cardiac issues ); Hypertension; and Coronary Artery Disease    1. Valvular heart disease    2. Essential hypertension      Patient with a history of hypertension, coronary artery disease and severe aortic stenosis. Patient has a history of moderate to severe aortic stenosis with an an VERNON of 0.7 cm2 at determined by echo on 2018. Per patient's request only conservative approaches. She is a patient of Dr. Paulette Gayle. Patient presents to clinic today for 6-month follow-up. Patient denies any complaints of chest pain, pressure or tightness. There is no shortness of breath, orthopnea or PND. Patient denies any lightheadedness, dizziness or syncope. Patient requests permission from Dr. Brody Canas and our office to be seen only by her primary care provider Dr. Michael Shi since she is not pursuing any treatment for her aortic stenosis. Did discuss with Dr. Brody Canas that this would be fine.     Subjective    Denny Duff is a 80 y.o. female with the following history as recorded in [facility]:    Patient Active Problem List    Diagnosis Date Noted    Moderate malnutrition ([facility] [address] 2019    Acute on chronic diastolic heart failure ([facility] [address] 2019    Hypertension 2019    Palliative care patient 2019    Nonrheumatic aortic valve stenosis 2018    Shoulder pain, left 2011    Primary localized osteoarthrosis of shoulder region 03/15/2011    Shoulder pain, right 2011     Current Outpatient Medications   Medication Sig Dispense Refill    cloNIDine (CATAPRES) 0.1 MG tablet Take 1 tablet by mouth nightly 90 tablet 3    amLODIPine (NORVASC) 5 MG tablet Take 1 tablet by mouth 2 times daily 180 tablet 3    carvedilol (COREG) 6.25 MG tablet Take 1 tablet by mouth 2 times daily (with meals) 180 tablet 3    lisinopril (PRINIVIL;ZESTRIL) 20 MG tablet Take 1 tablet by mouth 2 times daily 30 tablet 3    furosemide (LASIX) 40 MG tablet Take 1 tablet by mouth daily 60 tablet 3    cyclobenzaprine (FLEXERIL) 5 MG tablet Take 5 mg by mouth 3 times daily as needed for Muscle spasms      Acetaminophen (ARTHRITIS PAIN PO) Take 1 tablet by mouth as needed       aspirin 81 MG tablet Take 81 mg by mouth daily      busPIRone (BUSPAR) 15 MG tablet Take 15 mg by mouth 2 times daily      vitamin D (CHOLECALCIFEROL) 5000 units CAPS capsule Take 5,000 Units by mouth daily      diclofenac sodium 1 % GEL Apply 2 g topically 4 times daily       hydrOXYzine (ATARAX) 10 MG tablet Take 10 mg by mouth daily       lidocaine (XYLOCAINE) 5 % ointment Apply topically as needed for Pain Apply topically as needed.  guaiFENesin (MUCUS RELIEF) 400 MG tablet Take 600 mg by mouth 2 times daily       oxyCODONE-acetaminophen (PERCOCET)  MG per tablet Take 1 tablet by mouth every 6 hours as needed for Pain. Joleen Marineland predniSONE (DELTASONE) 5 MG tablet Take 5 mg by mouth daily as needed       senna (SENOKOT) 8.6 MG tablet Take 2 tablets by mouth daily      Esomeprazole Magnesium (NEXIUM PO) Take 40 mg by mouth 2 times daily       Sucralfate (CARAFATE PO) Take 1 g by mouth 3 times daily       Pentoxifylline (TRENTAL PO) Take 400 mg by mouth 2 times daily       FOLIC ACID PO Take 325 mcg by mouth daily       Cyanocobalamin (B-12 PO) Take 2,500 mcg by mouth daily       Multiple Vitamins-Minerals (OCUVITE) TABS Take 1 tablet by mouth 2 times daily       albuterol (PROVENTIL HFA;VENTOLIN HFA) 108 (90 BASE) MCG/ACT inhaler Inhale 2 puffs into the lungs every 6 hours as needed.  albuterol (PROVENTIL) (2.5 MG/3ML) 0.083% nebulizer solution Take 2.5 mg by nebulization every 6 hours as needed. No current facility-administered medications for this visit. Allergies: Celecoxib; Erythromycin; Ofloxacin; Penicillins; Phenytoin sodium extended; and Sulfa antibiotics  Past Medical History:   Diagnosis Date    Arthritis     CAD (coronary artery disease)     aortic stenosis    [facility]estive [facility]     GERD (gastroesophageal reflux disease)     Hypertension     Osteoporosis     Palliative care patient 2019    Rheumatoid arthritis(714.0)     Swelling     bilateral lower extremity     Past Surgical History:   Procedure Laterality Date    BRAIN ANEURYSM SURGERY      COLECTOMY      EYE SURGERY      bilateral    OTHER SURGICAL HISTORY      eye injections per Dr. Jose Capone, [facility]    SPLENECTOMY      TOTAL KNEE ARTHROPLASTY      bilatera     Family History   Problem Relation Age of Onset    Other Mother          from diverticulitis    Stroke Father     Cancer Other         siblings     Social History     Tobacco Use    Smoking status: Former Smoker     Types: Cigarettes    Smokeless tobacco: Never Used   Substance Use Topics    Alcohol use: No          Review of Systems:    General:      Complaint / Symptom Yes / No / Description if Yes       Fatigue No   Weight gain N/A   Insomnia N/A       Respiratory:        Complaint / Symptom Yes / No / Description if Yes       Cough No   Horseness N/A       Cardiovascular:    Complaint / Symptom Yes / No / Description if Yes       Chest Pain No   Shortness of Air / Orthopnea No   Presyncope / Syncope No   Palpitations No         Objective:    /76   Pulse 74   Ht 5' 8\" (1.727 m)   Wt 140 lb (63.5 kg)   SpO2 99%   BMI 21.29 kg/m²     GENERAL - well developed and well nourished, conversant  HEENT -  PERRLA, Hearing appears normal, gentleman lids are normal.  External inspection of ears and nose appear normal.  NECK - no thyromegaly, no JVD, trachea is in the midline  CARDIOVASCULAR - PMI is in the mid line clavicular position, Normal S1 and S2 with a grade 3/6 systolic murmur. No S3 or S4    PULMONARY - no respiratory distress. No wheezes or rales. Lungs are clear to ausculation, normal respiratory effort. ABDOMEN  - soft, non tender, no rebound  MUSCULOSKELETAL  - range of motion of the upper and lower extermites appears normal and equal and is without pain   EXTREMITIES - no significant edema   NEUROLOGIC - gait and station are normal  SKIN - turgor is normal, can warm and dry. PSYCHIATRIC - normal mood and affect, alert and orientated x 3,      ASSESSMENT:    ALL THE CARDIOLOGY PROBLEMS ARE LISTED ABOVE; HOWEVER, THE FOLLOWING SPECIFIC CARDIAC PROBLEMS / CONDITIONS WERE ADDRESSED AND TREATED DURING THE OFFICE VISIT TODAY:                                                                                            MEDICAL DECISION MAKING             Cardiac Specific Problem / Diagnosis  Discussion and Data Reviewed Diagnostic Procedures Ordered Management Options Selected           1. Aortic valve stenosis  Stable   Review and summation of old records:    Asymptomatic. Refer back to Dr. Milton Alva as needed. Patient is not wanting to pursue any treatments. No Continue current medications:     Yes           2. Hypertension  Well Controlled   Blood pressure in the office today 118/76. O2 sat 99%. No Continue current medications: Yes                                     No orders of the defined types were placed in this encounter. No orders of the defined types were placed in this encounter. Discussed with patient and adult child. Return if symptoms worsen or fail to improve. I greatly appreciate the opportunity to meet Peggy Alva and your confidence in allowing me to participate in her cardiovascular care. MYNOR Jim NP  2/17/2020 11:05 AM                    This dictation was generated by voice recognition computer software. Although all attempts are made to edit dictation for accuracy, there may be errors in the transcription that are not intended.

## 2020-03-17 PROBLEM — J96.90 RESPIRATORY FAILURE (HCC): Status: ACTIVE | Noted: 2020-01-01

## 2020-03-17 NOTE — ED PROVIDER NOTES
[facility] ICU  EMERGENCY DEPARTMENT ENCOUNTER      Pt Name: Mary Mora  MRN: [id number]  [place] 12/23/1920  Date of evaluation: 3/17/2020  Provider: Vivian Brandt MD    [address] [place] [address]       Chief Complaint   Patient presents with    Respiratory Distress     arrived via EMS from home with SOA, resp difficulty, low sats         HISTORY OF PRESENT ILLNESS   (Location/Symptom, Timing/Onset,Context/Setting, Quality, Duration, Modifying Factors, Severity)  Note limiting factors. Mary Mora is a 80 y.o. female who presents to the emergency department for distress. Patient reported shortness of breath gradually worsening throughout the night. On EMS arrival, oxygen saturations on room air were 48%. Patient was noted to have diffusely coarse breath sounds and wheezing. Patient was given a DuoNeb in route and initially placed on nonrebreather and then on CPAP with improvement of oxygen saturations to near 80 with CPAP. Patient has a heart failure history. Denies any associated chest pain or leg swelling recently. States she has had a cough for a long time but denies any acute worsening. Denies any associated fevers. HPI    NursingNotes were reviewed. REVIEW OF SYSTEMS    (2-9 systems for level 4, 10 or more for level 5)     Review of Systems   Constitutional: Negative for fatigue and fever. HENT: Negative for congestion, rhinorrhea and voice change. Eyes: Negative for pain and redness. Respiratory: Positive for cough, shortness of breath and wheezing. Cardiovascular: Negative for chest pain. Gastrointestinal: Negative for abdominal pain, diarrhea and vomiting. Endocrine: Negative. Genitourinary: Negative. Musculoskeletal: Negative for arthralgias and gait problem. Skin: Negative for rash and wound. Neurological: Negative for weakness and headaches. Hematological: Negative. Psychiatric/Behavioral: Negative. All other systems reviewed and are negative.       A complete review of 400 MG tablet Take 600 mg by mouth 2 times daily       oxyCODONE-acetaminophen (PERCOCET)  MG per tablet Take 1 tablet by mouth every 6 hours as needed for Pain. .      predniSONE (DELTASONE) 5 MG tablet Take 5 mg by mouth daily as needed       senna (SENOKOT) 8.6 MG tablet Take 2 tablets by mouth daily      Esomeprazole Magnesium (NEXIUM PO) Take 40 mg by mouth 2 times daily       Sucralfate (CARAFATE PO) Take 1 g by mouth 3 times daily       Pentoxifylline (TRENTAL PO) Take 400 mg by mouth 2 times daily       FOLIC ACID PO Take 704 mcg by mouth daily       albuterol (PROVENTIL HFA;VENTOLIN HFA) 108 (90 BASE) MCG/ACT inhaler Inhale 2 puffs into the lungs every 6 hours as needed. albuterol (PROVENTIL) (2.5 MG/3ML) 0.083% nebulizer solution Take 2.5 mg by nebulization every 6 hours as needed. Acetaminophen (ARTHRITIS PAIN PO) Take 1 tablet by mouth as needed       vitamin D (CHOLECALCIFEROL) 5000 units CAPS capsule Take 5,000 Units by mouth daily      Cyanocobalamin (B-12 PO) Take 2,500 mcg by mouth daily       Multiple Vitamins-Minerals (OCUVITE) TABS Take 1 tablet by mouth 2 times daily              ALLERGIES     Celecoxib; Erythromycin; Ofloxacin; Penicillins; Phenytoin sodium extended; and Sulfa antibiotics    FAMILY HISTORY       Family History   Problem Relation Age of Onset    Other Mother          from diverticulitis    Stroke Father     Cancer Other         siblings          SOCIAL HISTORY       Social History     Socioeconomic History    Marital status:       Spouse name: None    Number of children: None    Years of education: None    Highest education level: None   Occupational History    None   Social Needs    Financial resource strain: None    Food insecurity     Worry: None     Inability: None    Transportation needs     Medical: None     Non-medical: None   Tobacco Use    Smoking status: Former Smoker     Types: Cigarettes    Smokeless tobacco: Never Used decreased breath sounds. Decreased breath sounds and wheezing present. No rales. Comments: Diffuse wheezing and coarse breath sounds. Breath sounds louder on the left side  Chest:      Chest wall: No tenderness. Abdominal:      General: There is no distension. Palpations: Abdomen is soft. Tenderness: There is no abdominal tenderness. There is no guarding or rebound. Musculoskeletal: Normal range of motion. General: No deformity. Right lower leg: No edema. Left lower leg: No edema. Skin:     General: Skin is warm and dry. Coloration: Skin is not pale. Findings: No erythema. Neurological:      Mental Status: She is alert and oriented to person, place, and time. GCS: GCS eye subscore is 4. GCS verbal subscore is 5. GCS motor subscore is 6. Cranial Nerves: No cranial nerve deficit. Motor: No abnormal muscle tone. Coordination: Coordination normal.   Psychiatric:         Behavior: Behavior normal.         Judgment: Judgment normal.         DIAGNOSTIC RESULTS     EKG: All EKG's are interpreted by the Emergency Department Physician who either signs or Co-signs this chart in the absence of a cardiologist.      RADIOLOGY:   Non-plain film images such as CT, Ultrasound and MRI are read by the radiologist. [facility] are visualized and preliminarily interpreted by the emergency physician with the below findings:        Interpretation per the Radiologist below, if available at the time of this note:    XR CHEST PORTABLE   Final Result   Impression:   Right pleural effusion, collapse of the right middle and lower lobes.    Signed by Dr Tex Elias on 3/17/2020 10:03 AM            ED BEDSIDE ULTRASOUND:   Performed by ED Physician - none    LABS:  Labs Reviewed   CBC WITH AUTO DIFFERENTIAL - Abnormal; Notable for the following components:       Result Value    WBC 15.3 (*)     RBC 3.81 (*)     Hemoglobin 11.9 (*)     .3 (*)     MCH 31.2 (*)     MCHC 31.2 (*)     RDW 15.8 (*)     Platelets 432 (*)     Neutrophils % 76.0 (*)     Lymphocytes % 6.8 (*)     Eosinophils % 6.7 (*)     Neutrophils Absolute 11.6 (*)     Lymphocytes Absolute 1.0 (*)     Monocytes Absolute 1.30 (*)     Eosinophils Absolute 1.00 (*)     All other components within normal limits   BLOOD GAS, ARTERIAL - Abnormal; Notable for the following components:    pO2, Arterial 37.0 (*)     Base Excess, Arterial -3.0 (*)     Hemoglobin, Art, Extended 11.8 (*)     O2 Sat, Arterial 76.6 (*)     All other components within normal limits    Narrative:     CALL  Patel Torres RN ER, 03/17/2020 10:01, by Amanda Flynn   LACTIC ACID, PLASMA - Abnormal; Notable for the following components:    Lactic Acid 2.0 (*)     All other components within normal limits    Narrative:     CALL  Patel JOSHI tel. ,  Chemistry results called to and read back by ALISE WHARTON [facility] WHARTON Durham RN AT [address], 03/17/2020  09:59, by Jonnie Green - Abnormal; Notable for the following components:    Pro-BNP 2,177 (*)     All other components within normal limits   TROPONIN - Abnormal; Notable for the following components:    Troponin 0.04 (*)     All other components within normal limits   COMPREHENSIVE METABOLIC PANEL W/ REFLEX TO MG FOR LOW K - Abnormal; Notable for the following components:    CO2 20 (*)     Glucose 120 (*)     BUN 29 (*)     CREATININE 1.2 (*)     GFR Non- 41 (*)     Alb 3.3 (*)     ALT <5 (*)     All other components within normal limits   RAPID INFLUENZA A/B ANTIGENS   CULTURE, BLOOD 1   CULTURE, BLOOD 2   PROTIME-INR   APTT   POTASSIUM, WHOLE BLOOD   SPECIMEN REJECTION   PROCALCITONIN       All other labs were within normal range or not returned as of this dictation.     Medications   ipratropium-albuterol (DUONEB) 0.5-2.5 (3) MG/3ML nebulizer solution (  Canceled Entry 3/17/20 1776)   sodium chloride flush 0.9 % injection 10 mL (has no administration in time range)   sodium chloride flush 0.9 % injection 10 mL (has no administration in time range)   acetaminophen (TYLENOL) tablet 650 mg (has no administration in time range)   enoxaparin (LOVENOX) injection 30 mg (has no administration in time range)   ipratropium-albuterol (DUONEB) nebulizer solution 1 ampule (1 ampule Inhalation Given 3/17/20 0942)   methylPREDNISolone sodium (SOLU-MEDROL) injection 80 mg (80 mg Intravenous Given 3/17/20 0946)   cefTRIAXone (ROCEPHIN) 1 g in sterile water 10 mL IV syringe (0 g Intravenous Stopped 3/17/20 1115)   azithromycin (ZITHROMAX) 500 mg in dextrose 5% 250 mL IVPB (0 mg Intravenous Stopped 3/17/20 1127)   furosemide (LASIX) injection 60 mg (60 mg Intravenous Given 3/17/20 1127)       EMERGENCY DEPARTMENT COURSE and DIFFERENTIALDIAGNOSIS/MDM:   Vitals:    Vitals:    03/17/20 1100 03/17/20 1200 03/17/20 1300 03/17/20 1410   BP: (!) 142/74 138/70 (!) 142/66 (!) 181/101   Pulse: 81 74 70 112   Resp: 18 17 18 26   Temp:  98.2 °F (36.8 °C) 98.4 °F (36.9 °C) 97.8 °F (36.6 °C)   TempSrc:    Temporal   SpO2: 98% 97% 98% 90%   Weight:    133 lb 1.6 oz (60.4 kg)   Height:    5' 7\" (1.702 m)       MDM    ED Course as of Mar 17 1501   Tue Mar 17, 2020   0942 On arrival, oxygen saturation in the 70s on CPAP. Patient transitioned to BiPAP. At this time, oxygen saturations near 88 on 25 L of flow on BiPAP. [CRAIG]   9532 EKG shows ventricular bigeminy with signs of LVH without signs of acute ischemia or infarction    [CRAIG]   1018 Chest x-ray shows a right pleural effusion with compressive atelectasis of the right lower and middle lobes. No signs of focal consolidation. Patient does have elevated white blood cell count so antibiotics were added for possible atypical infection. [CRAIG]   1112 Oxygen saturation improved to 98% on BiPAP at this time. Patient states she is feeling much better.     [CRAIG]      ED Course User Index  [CRAIG] Yvonne Haskins MD       Based on the evaluation and work-up here patient is felt to require further monitoring, work-up, or treatment that is available in the emergency department. Case was discussed with Dr. Tim Ramirez who agrees for observation or admission for further management. Treatment and stabilization as necessary were provided in the emergency department prior to transfer of care to the family medicine service. CONSULTS:  PALLIATIVE CARE EVAL    PROCEDURES:  Unless otherwise notedbelow, none     Procedures    CRITICAL CARE TIME   Total Critical Care time was >30 minutes, excluding separately reportable procedures. There was a high probability of clinically significant/life threatening deterioration in the patient's condition which required my urgent intervention. FINAL IMPRESSION     1. Acute respiratory failure with hypoxia ([address]    2. Recurrent right pleural effusion    3. Acute on chronic diastolic (congestive) heart failure ([facility] [address]    4.  Chronic obstructive pulmonary disease with acute exacerbation [facility])          DISPOSITION/PLAN   DISPOSITION        PATIENT REFERRED TO:  Sarah Mora MD  [facility] [address] [id number]  [phone]            DISCHARGE MEDICATIONS:  Current Discharge Medication List             (Please note that portions of this note were completed with a voice recognition program.  Efforts were made to edit the dictations butoccasionally words are mis-transcribed.)    Saulo Flores MD (electronically signed)  AttendingEmergency Physician          Saulo Flores., MD  03/17/20 [id number]

## 2020-03-17 NOTE — CONSULTS
Palliative care initiated on pt. PC order placed by staff nurse. PC nurse and  went to see pt to establish code status since pt does not have living will on file. Pt comes to ED via EMS with resp distress and found with O2 sats in the 40's. On arrival to ICU pt /101 at my visit pt BP now 131/66 and sats 89%, O2 30L. Pt is alert and oriented with Bi pap in place. PC nurse asked pt who she lived with or if she lived alone. Pt tells me she lives with her dtr Jina Pugh. She also tells me she uses a walker to amb and is able to give herself a sponge bath. Currently pt is being diuresed and duo nebs administered. Discussed with pt if she had a living will. Pt tells me she does not. PC nurse with [facility]  present talked with pt about her desire for resuscitation. Asked her if Bi pap is not enough would she want to have a \"tube\" placed to help her breath. Pt responded with \"I don't see any need it won't do much good\". When I asked pt if she wanted CPR or shock should her heart stop, pt replied, \"I have 2 valves in my heart\" and she adds \"I don't want my kids to have to make that decision\". PC nurse clarified with pt if she understood this was a DNR status, she replied \"Yes\". I asked again if this is what she wants? Pt stated \"Yes\". I also asked her permission to call and speak with her dtr Jina Pugh about her decision. Pt responded with \"Yes you can\". Called and spoke with dtr Bradford Ramírez about pt decision to be DNR-CC. Pt dtr is in agreement and has stated this is what she knows pt wants. Dtr thanked me for the call. I also explained if she wants to check on her mother to call ICU for update. Palliative will follow for support.   Electronically signed by Dino Sorenson RN on 3/17/2020 at 3:39 PM

## 2020-03-17 NOTE — PROGRESS NOTES
Visited with pt with PC nurse Charles Guzmán Palliative was called to confirm pt's code status. Caitlyn Sowmya spoke with pt about code status and whether or not she wanted a ventilator or CPR. Pt says she doesn't want CPR or a ventilator and indicated she wanted to be a DNR. Pt says she 80years old and what good would all that do. This  provided spiritual care with sustaining presence, and prayer. Pt expressed gratitude for spiritual care.     Electronically signed by Eladio Mendez on 3/17/2020 at 3:49 PM

## 2020-03-18 NOTE — PROGRESS NOTES
Palliative care nurse in to see pt. She is resting at present time, bi pap in place. Appears comfortable. O2 sats 100%, VS WNL. Will continue to follow for support, goals of care.   Electronically signed by Haleigh Pressley RN on 3/18/2020 at 10:09 AM

## 2020-03-18 NOTE — H&P
History and Physical      CHIEF COMPLAINT:  SOA    Reason for Admission:  ARF with hypoxia    History Obtained From:  Patient, chart    HISTORY OF PRESENT ILLNESS:      The patient is a 80 y.o. female who was admitted from ER with SOA, hypoxia. The patient denies any fever. She has a chronic cough, that she says is unchanged. No worsening edema. No GI issues. No dysuria. She has had no worsening weakness, HA. No changes in meds.      Past Medical History:        Diagnosis Date    Arthritis     CAD (coronary artery disease)     aortic stenosis    [facility]est[facility]     GERD (gastroesophageal reflux disease)     Hypertension     Osteoporosis     Palliative care patient 01/11/2019    Rheumatoid arthritis(714.0)     Swelling     bilateral lower extremity     Past Surgical History:        Procedure Laterality Date    BRAIN ANEURYSM SURGERY      COLECTOMY      EYE SURGERY      bilateral    OTHER SURGICAL HISTORY      eye injections per Dr. Randy Donato, [facility]    SPLENECTOMY      TOTAL KNEE ARTHROPLASTY      bilatera         Medications Prior to Admission:    Medications Prior to Admission: amLODIPine (NORVASC) 5 MG tablet, Take 1 tablet by mouth 2 times daily  cloNIDine (CATAPRES) 0.1 MG tablet, Take 1 tablet by mouth nightly  carvedilol (COREG) 6.25 MG tablet, Take 1 tablet by mouth 2 times daily (with meals)  lisinopril (PRINIVIL;ZESTRIL) 20 MG tablet, Take 1 tablet by mouth 2 times daily  furosemide (LASIX) 40 MG tablet, Take 1 tablet by mouth daily  cyclobenzaprine (FLEXERIL) 5 MG tablet, Take 5 mg by mouth 3 times daily as needed for Muscle spasms  aspirin 81 MG tablet, Take 81 mg by mouth daily  busPIRone (BUSPAR) 15 MG tablet, Take 15 mg by mouth 2 times daily  diclofenac sodium 1 % GEL, Apply 2 g topically 4 times daily   hydrOXYzine (ATARAX) 10 MG tablet, Take 10 mg by mouth daily   lidocaine (XYLOCAINE) 5 % ointment, Apply topically as needed for Pain Apply topically as needed. guaiFENesin (MUCUS RELIEF) 400 MG tablet, Take 600 mg by mouth 2 times daily   oxyCODONE-acetaminophen (PERCOCET)  MG per tablet, Take 1 tablet by mouth every 6 hours as needed for Pain. .  predniSONE (DELTASONE) 5 MG tablet, Take 5 mg by mouth daily as needed   senna (SENOKOT) 8.6 MG tablet, Take 2 tablets by mouth daily  Esomeprazole Magnesium (NEXIUM PO), Take 40 mg by mouth 2 times daily   Sucralfate (CARAFATE PO), Take 1 g by mouth 3 times daily   Pentoxifylline (TRENTAL PO), Take 400 mg by mouth 2 times daily   FOLIC ACID PO, Take 496 mcg by mouth daily   albuterol (PROVENTIL HFA;VENTOLIN HFA) 108 (90 BASE) MCG/ACT inhaler, Inhale 2 puffs into the lungs every 6 hours as needed. albuterol (PROVENTIL) (2.5 MG/3ML) 0.083% nebulizer solution, Take 2.5 mg by nebulization every 6 hours as needed. Acetaminophen (ARTHRITIS PAIN PO), Take 1 tablet by mouth as needed   vitamin D (CHOLECALCIFEROL) 5000 units CAPS capsule, Take 5,000 Units by mouth daily  Cyanocobalamin (B-12 PO), Take 2,500 mcg by mouth daily   Multiple Vitamins-Minerals (OCUVITE) TABS, Take 1 tablet by mouth 2 times daily     Allergies:  Celecoxib; Erythromycin; Ofloxacin; Penicillins; Phenytoin sodium extended; and Sulfa antibiotics    Social History:   TOBACCO:   reports that she has quit smoking. Her smoking use included cigarettes. She has never used smokeless tobacco.  ETOH:   reports no history of alcohol use. DRUGS:   reports no history of drug use.   MARITAL STATUS:  Not   OCCUPATION:  Not working  Patient currently lives with family       Family History:       Problem Relation Age of Onset    Other Mother          from diverticulitis    Stroke Father     Cancer Other         siblings     REVIEW OF SYSTEMS:  Constitutional: neg  CV: neg  Pulmonary: SOA, cough  GI: neg  : neg  Psych: neg  Neuro: neg  Skin: neg  MusculoSkeletal: neg  HEENT: neg  Joints: neg  Vitals:  BP (!) 159/91   Pulse 112   Temp 98.5 °F (36.9 °C) (Temporal)   Resp 20   Ht 5' 7\" (1.702 m)   Wt 133 lb 1.6 oz (60.4 kg)   SpO2 98%   BMI 20.85 kg/m²     PHYSICAL EXAM:  Gen: NAD, alert, pleasant, on BIPAP  HEENT: WNL  Lymph: no LAD  Neck: no JVD or masses  Chest: coarse, with some wheezing  CV: RRR  Abdomen: NT/ND  Extrem: no C/C/E  Neuro: non focal  Skin: no rashes  Joints: no redness  DATA:  I have reviewed the admission labs and imaging tests.     ASSESSMENT AND PLAN:      Active Problems:    Respiratory failure, Hypoxia---supportive care with BIPAP, antibiotics, follow with cultures, continue neb treatments, Pulm has been consulted    Bronchospasm--continue steroids   CAD, CHF--supportive care   HTN--follow BP   RA, Chronic Pain--supportive care           Elio Boas, MD  8:31 PM 3/17/2020

## 2020-03-19 ENCOUNTER — OUTSIDE FACILITY SERVICE (OUTPATIENT)
Dept: PULMONOLOGY | Facility: CLINIC | Age: 85
End: 2020-03-19

## 2020-03-19 PROCEDURE — 99222 1ST HOSP IP/OBS MODERATE 55: CPT | Performed by: INTERNAL MEDICINE

## 2020-03-20 ENCOUNTER — OUTSIDE FACILITY SERVICE (OUTPATIENT)
Dept: PULMONOLOGY | Facility: CLINIC | Age: 85
End: 2020-03-20

## 2020-03-20 PROCEDURE — 99231 SBSQ HOSP IP/OBS SF/LOW 25: CPT | Performed by: INTERNAL MEDICINE

## 2020-03-20 NOTE — PROGRESS NOTES
Consult ST and GI for dysphagia      Discharge planning:   her home     Reviewed treatment plans with the patient and/or family.              Electronically signed by Massimo Sandoval MD on 3/20/2020 at 10:21 AM

## 2020-03-20 NOTE — CONSULTS
ophthalmic solution Place 1 drop into both eyes 4 times daily   Yes Historical Provider, MD   amLODIPine (NORVASC) 5 MG tablet Take 1 tablet by mouth 2 times daily 3/5/20  Yes MYNOR Bains   cloNIDine (CATAPRES) 0.1 MG tablet Take 1 tablet by mouth nightly 8/12/19  Yes Minor Meyer MD   carvedilol (COREG) 6.25 MG tablet Take 1 tablet by mouth 2 times daily (with meals) 2/27/19  Yes MYNOR Jones NP   lisinopril (PRINIVIL;ZESTRIL) 20 MG tablet Take 1 tablet by mouth 2 times daily 1/16/19  Yes Rosario Jha MD   furosemide (LASIX) 40 MG tablet Take 1 tablet by mouth daily 1/17/19  Yes Rosario Jha MD   cyclobenzaprine (FLEXERIL) 5 MG tablet Take 5 mg by mouth 3 times daily as needed for Muscle spasms   Yes Historical Provider, MD   aspirin 81 MG tablet Take 81 mg by mouth daily   Yes Historical Provider, MD   busPIRone (BUSPAR) 15 MG tablet Take 15 mg by mouth 2 times daily   Yes Historical Provider, MD   diclofenac sodium 1 % GEL Apply 2 g topically 4 times daily    Yes Historical Provider, MD   hydrOXYzine (ATARAX) 10 MG tablet Take 10 mg by mouth daily    Yes Historical Provider, MD   lidocaine (XYLOCAINE) 5 % ointment Apply topically as needed for Pain Apply topically as needed. Yes Historical Provider, MD   guaiFENesin (MUCUS RELIEF) 400 MG tablet Take 600 mg by mouth 2 times daily    Yes Historical Provider, MD   oxyCODONE-acetaminophen (PERCOCET)  MG per tablet Take 1 tablet by mouth every 6 hours as needed for Pain.  .   Yes Historical Provider, MD   predniSONE (DELTASONE) 5 MG tablet Take 5 mg by mouth daily as needed    Yes Historical Provider, MD   senna (SENOKOT) 8.6 MG tablet Take 2 tablets by mouth daily   Yes Historical Provider, MD   Esomeprazole Magnesium (NEXIUM PO) Take 40 mg by mouth 2 times daily    Yes Historical Provider, MD   Sucralfate (CARAFATE PO) Take 1 g by mouth 3 times daily    Yes Historical Provider, MD   Pentoxifylline (TRENTAL PO) Take 400 mg by : The right heart border is obscured. Lower right lung zone opacity, reflecting effusion and probable lobar collapse of the right middle and lower lobes. There is no measurable pneumothorax. The bones show no acute pathology. Impression: Right pleural effusion, collapse of the right middle and lower lobes. Signed by Dr Trina Nettles on 3/17/2020 10:03 AM      Assessment:  1. Dysphagia  2. Respiratory failure    Plan:  - Pt has a long standing history of dysphagia symptoms in which she takes her time to eat and avoids foods such as bread which have delayed passage through the esophagus per the patient. Recommend a swallow evaluation with speech and if she passes, then follow those recommendations for her diet.  At this time, due to COVID-19 issues, would not recommend EGD evaluation unless absolutely necessary  - will continue to follow and await swallow evaluation    Cassia Reyes,

## 2020-03-20 NOTE — CONSULTS
Pulmonary and Critical Care Consult Note  THE [facility] Sandoval Villagomez    MR# [id number]    Acct# [de-identified]  3/19/2020   10:15 PM CDT    Referring Fina Kellogg MD  Chief Complaint: hypoxia    HPI: We have been consulted to see this 80y.o. year old female born on 12/23/1920. Pt presented with hypoxia through the ED. She was admitted to the ICU. Dr. Gracia Barbosa has requested evaluation. She complains of pain with palpation of the right thorax but reports she is not short of breath to me.   She was dyspneic on arrival.  No hx lung disease  Past Medical History      Past Medical History:   Diagnosis Date    Arthritis     CAD (coronary artery disease)     aortic stenosis    [facility]congest[facility] failure) [facility])     GERD (gastroesophageal reflux disease)     Hypertension     Osteoporosis     Palliative care patient 01/11/2019    Rheumatoid arthritis(714.0)     Swelling     bilateral lower extremity     SurgicalHistory  Past Surgical History:   Procedure Laterality Date    BRAIN ANEURYSM SURGERY      COLECTOMY      EYE SURGERY      bilateral    OTHER SURGICAL HISTORY      eye injections per Dr. Erma Mendes, [facility]    SPLENECTOMY      TOTAL KNEE ARTHROPLASTY      bilatera     Allergies  Allergies   Allergen Reactions    Celecoxib     Erythromycin     Ofloxacin     Penicillins     Phenytoin Sodium Extended     Sulfa Antibiotics      Medications    busPIRone, 15 mg, Oral, BID    sucralfate, 1 g, Oral, 4x Daily AC & HS    carvedilol, 6.25 mg, Oral, BID WC    furosemide, 40 mg, Oral, Daily    aspirin, 81 mg, Oral, Daily    methylPREDNISolone, 40 mg, Intravenous, Q8H    carboxymethylcellulose, 1 drop, Both Eyes, 4x Daily    pantoprazole, 40 mg, Oral, BID AC    enoxaparin, 30 mg, Subcutaneous, Q24H    azithromycin, 500 mg, Intravenous, Q24H    cefTRIAXone (ROCEPHIN) IV, 1 g, Intravenous, Q24H    ipratropium-albuterol, 1 ampule, Inhalation, 4x daily   Social History reports that she has quit smoking. Her smoking use included cigarettes. She has never used smokeless tobacco. She reports that she does not drink alcohol or use drugs. Family History  family history includes Cancer in an other family member; Other in her mother; Stroke in her father. Review of Systems:  Review of Systems   Constitutional: Negative for fever. Cardiovascular: Negative for chest pain. Physical Exam:   height is 5' 7\" (1.702 m) and weight is 137 lb (62.1 kg). Her temperature is 97.3 °F (36.3 °C). Her blood pressure is 92/60 and her pulse is 98. Her respiration is 18 and oxygen saturation is 98%. Intake/Output Summary (Last 24 hours) at 3/19/2020 2215  Last data filed at 3/19/2020 1400  Gross per 24 hour   Intake 496.67 ml   Output 450 ml   Net 46.67 ml     Physical Exam  Vitals signs reviewed. Constitutional:       General: She is not in acute distress. Appearance: She is well-developed. She is ill-appearing. She is not toxic-appearing. Interventions: Nasal cannula in place. Comments: elderly   HENT:      Head: Normocephalic and atraumatic. Eyes:      General: No scleral icterus. Extraocular Movements: Extraocular movements intact. Cardiovascular:      Rate and Rhythm: Normal rate and regular rhythm. Heart sounds: No murmur. Pulmonary:      Effort: No respiratory distress. Breath sounds: No wheezing. Chest:      Chest wall: No tenderness. Abdominal:      General: Bowel sounds are normal.      Palpations: Abdomen is soft. Musculoskeletal: Normal range of motion. Skin:     General: Skin is warm and dry. Neurological:      General: No focal deficit present.    Psychiatric:         Behavior: Behavior normal.       Recent Labs     03/17/20  0935 03/18/20  0113   WBC 15.3* 8.2   RBC 3.81* 3.86*   HGB 11.9* 12.1   HCT 38.2 37.6   * 497*   .3* 97.4   MCH 31.2* 31.3*   MCHC 31.2* 32.2*   RDW 15.8* 15.6*      Recent Labs     03/17/20  1015

## 2020-03-20 NOTE — PROGRESS NOTES
distress. COMPARISON:  Chest x-ray dated 1/10/2019. Findings : The right heart border is obscured. Lower right lung zone opacity, reflecting effusion and probable lobar collapse of the right middle and lower lobes. There is no measurable pneumothorax. The bones show no acute pathology. Impression: Right pleural effusion, collapse of the right middle and lower lobes. Signed by Dr Roxanna Hanley on 3/17/2020 10:03 AM    My radiograph interpretation/independent review of imaging: no new imaging 3/20/10  Other test results (not lab or imaging): echo 2018  The aortic valve is indeterminent for leaflet number, severely thickened &   calcified with severely reduced leaflet separation. Severe aortic stenosis; no evidence of aortic regurgitation. The aortic valve area is 0.7 cm2 with a maximum gradient of 61 mmHg and a   mean gradient of 35 mmHg. Mild tricuspid regurgitation with estimated RVSP of 38 mm Hg. Moderately dilated left atrium. Technically difficult examination. Left ventricular ejection fraction is visually estimated at 60%. Moderate concentric left ventricular hypertrophy. Normal left ventricular size and function. Problem list generated by [employer]:  Patient Active Problem List   Diagnosis    Shoulder pain, right    Primary localized osteoarthrosis of shoulder region    Shoulder pain, left    Nonrheumatic aortic valve stenosis    Palliative care patient    Acute on chronic diastolic heart failure ([address]    Hypertension    Moderate malnutrition ([address]    Respiratory failure (HCC)     Pulmonary Assessment:  New problem (to me), with additional workup planned:   1. Atelectasis right lower lung   New problem (to me), no additional workup planned:   1. arthrtitis and malnutrition  Other problems either stable, failing to improve or worsenin. Diastolic heart failure, probably stable  2.  Hypoxemia due to atelectasis    Recommend/plan:   · Continue supplemental oxygen and wean  · Agree with IS use   · Discussed patient's concerns about her meal/ crackers, risk for aspiration with nursing to discuss with attending. Recommend speech eval.   · Aerosol bronchodilator to encourage cough  · IV steroids decreased yesterday to begin today. Wean as tolerated   · Antibiotics per attending. Electronically signed by Jossy Montgomery on 03/20/20 at 8:45 AM     Physician Substantive portion:  She does not identify any other specific pulmonary complaints at this point. Exam shows diminished breath sounds in the bases. She is not in any distress. Recommend dietary concerns above. Bronchodilators. Continue to taper off steroids. At this point will sign off. She is not a candidate for bronchoscopy or other invasive interventions given her age. I have seen and examined patient personally, performing a face-to-face diagnostic evaluation with plan of care reviewed and developed with APRN and nursing staff. I have addended and/or modified the above history of present illness, physical examination, and assessment and plan to reflect my findings and impressions. Essential elements of the care plan were discussed with APRN above. Agree with findings and assessment/plan as documented above.     Electronically signed by Tristan Zavaleta on 3/20/2020, 5:25 PM

## 2020-03-20 NOTE — CARE COORDINATION
Given this pt's readmission risk score being greater than 10% (current 16%), consider the initiation of Home Care services. If you feel appropriate, please order at discharge. Thank you.     Electronically signed by Paddy Palacios on 3/20/2020 at 9:08 AM

## 2020-03-20 NOTE — PROGRESS NOTES
Speech Language Pathology  Facility/Department: [facility] 4 ONCOLOGY UNIT   CLINICAL BEDSIDE SWALLOW EVALUATION    NAME: Stef Rao  : 1920  MRN: [id number]    ADMISSION DATE: 3/17/2020  ADMITTING DIAGNOSIS: has Shoulder pain, right; Primary localized osteoarthrosis of shoulder region; Shoulder pain, left; Nonrheumatic aortic valve stenosis; Palliative care patient; Acute on chronic diastolic heart failure ([address] Hypertension; Moderate malnutrition ([address] and Respiratory failure ([address] on their problem list.    Date of Eval: 3/20/2020  Evaluating Therapist: Mylene Cardona    Primary Complaint  Patient reported inconsistent sensation of foods and medication sticking at neck base and at mid chest level post swallows. Patient reported inconsistent belching with thin liquids. Reason for Referral  Stef Rao was referred for a bedside swallow evaluation to assess the efficiency of her swallow function, identify signs and symptoms of aspiration and make recommendations regarding safe dietary consistencies, effective compensatory strategies, and safe eating environment. Impression  Assessed patient's swallowing function. Patient exhibits slow oral prep of more solid consistencies as well as sluggish, moderate-severely decreased laryngeal elevation for swallow airway protection. No outward S/S penetration/aspiration was noted with any puree consistency trial, regular solid consistency trial, or thin H2O trial administered during evaluation this date. Patient did report sensation of regular  solid consistency sticking at neck base post swallows; patient reported sensation was alleviated with multiple liquid washes. At this time, question pharyngeal/esophageal dysphagia. Would trial minced and moist consistency. Thin liquids. Recommend meds in pudding/applesauce. Will continue to follow. Thank you for this consult.      Treatment Plan  Requires SLP Intervention: Yes     Recommended Diet and Intervention  Diet Solids Recommendation: Minced and moist   Liquid Consistency Recommendation: Thin   Recommended Form of Meds: Meds in puree as able  Therapeutic Interventions: Patient/Family education;Diet tolerance monitoring; Therapeutic PO trials with SLP     Compensatory Swallowing Strategies  Compensatory Swallowing Strategies: Upright as possible for all oral intake;Self-feed;Small bites/sips;Eat/Feed slowly; Alternate solids and liquids; Remain upright for 30-45 minutes after meals     Treatment/Goals  Timeframe for Short-term Goals: 1x/day for 3 days   Goal 1: Patient will tolerate minced and moist consistency and thin liquids with min S/S penetration/aspiration during PO intake. Goal 2: Patient staff will follow swallow safety recommendations to decrease risk of penetration/aspiration during PO intake. Goal 3: Upgrade patient diet as tolerated. General  Chart Reviewed: Yes  Behavior/Cognition: Alert; Cooperative  Communication Observation: (SLP ranked functional intelligibility of speech for unfamiliar listeners at 100% without background noise present.)  Follows Directions: Complex  Patient Positioning: Upright in bed  Consistencies Administered: Dysphagia Pureed (Dysphagia I); Regular solid; Thin - straw      Assessed patient's swallowing function with the following observations noted:     Oral Phase  Mastication: Regular solid (Patient exhibited slow oral prep of regular solid consistency trials presented independently.)  Oral Phase - Comment:  Oral transit of puree consistency trials and regular solid consistency trials, all presented independently, primarily measured 1-2 seconds in length and min oral cavity residue (of regular solid consistency) was noted post swallows; residue cleared from the mouth with additional dry swallows. Oral transit of thin H2O trials, presented independently via straw, primarily measured 1-2 seconds in length.       Indicators of Pharyngeal Phase Dysfunction  Pharyngeal Phase: Exceptions  Indicators of Pharyngeal Phase Dysfunction  Decreased Laryngeal Elevation: (Patient exhibited sluggish, moderate-severely decreased laryngeal elevation for swallow airway protection.)  Pharyngeal: No outward S/S penetration/aspiration was noted with any puree consistency trial, regular solid consistency trial, or thin H2O trial administered during assessment this date. Patient did report sensation of regular solid consistency sticking at neck base post swallows; patient reported sensation was alleviated with multiple liquid washes. At this time, question pharyngeal/esophageal dysphagia. Would trial minced and moist consistency. Thin liquids. Recommend meds in pudding/applesauce. Will continue to follow.     Electronically signed by WHIT Ballesteros on 3/20/2020 at 2:03 PM

## 2020-03-21 NOTE — PLAN OF CARE
Problem: Falls - Risk of:  Goal: Will remain free from falls  Description: Will remain free from falls  3/19/2020 1529 by Wale Rehman RN  Outcome: Ongoing  3/19/2020 1214 by Crystal Faulkner RN  Outcome: Ongoing  3/19/2020 0237 by Germán Ceballos RN  Outcome: Ongoing  Goal: Absence of physical injury  Description: Absence of physical injury  3/19/2020 1529 by Wale Rehman RN  Outcome: Ongoing  3/19/2020 1214 by Crystal Faulkner RN  Outcome: Ongoing  3/19/2020 0237 by Germán Ceballos RN  Outcome: Ongoing     Problem: OXYGENATION/RESPIRATORY FUNCTION  Goal: Patient will achieve/maintain normal respiratory rate/effort  Description: Respiratory rate and effort will be within normal limits for the patient  3/19/2020 1529 by Wale Rehman RN  Outcome: Ongoing  3/19/2020 1214 by Crystal Faulkner RN  Outcome: Ongoing  3/19/2020 0237 by Germán Ceballos RN  Outcome: Ongoing     Problem: SKIN INTEGRITY  Goal: Skin integrity is maintained or improved  3/19/2020 1529 by Wale Rehman RN  Outcome: Ongoing  3/19/2020 1214 by Crystal Faulkner RN  Outcome: Ongoing  3/19/2020 0237 by Germán Ceballos RN  Outcome: Ongoing     Problem: Infection:  Goal: Will remain free from infection  Description: Will remain free from infection  3/19/2020 1529 by Wale Rehman RN  Outcome: Ongoing  3/19/2020 1214 by Crystal Faulkner RN  Outcome: Ongoing  3/19/2020 0237 by Germán Ceballos RN  Outcome: Ongoing     Problem: Safety:  Goal: Free from accidental physical injury  Description: Free from accidental physical injury  3/19/2020 1529 by Wale Rehman RN  Outcome: Ongoing  3/19/2020 1214 by Crystal Faulkner RN  Outcome: Ongoing  3/19/2020 0237 by Germán Ceballos RN  Outcome: Ongoing  Goal: Free from intentional harm  Description: Free from intentional harm  3/19/2020 1529 by Wale Rehman RN  Outcome: Ongoing  3/19/2020 1214 by Crystal Faulkner RN  Outcome: Ongoing  3/19/2020 0237 by Germán Ceballos RN  Outcome: Ongoing
Problem: Falls - Risk of:  Goal: Will remain free from falls  Description: Will remain free from falls  Outcome: Ongoing  Goal: Absence of physical injury  Description: Absence of physical injury  Outcome: Ongoing     Problem: OXYGENATION/RESPIRATORY FUNCTION  Goal: Patient will achieve/maintain normal respiratory rate/effort  Description: Respiratory rate and effort will be within normal limits for the patient  Outcome: Ongoing     Problem: SKIN INTEGRITY  Goal: Skin integrity is maintained or improved  Outcome: Ongoing     Problem: Infection:  Goal: Will remain free from infection  Description: Will remain free from infection  Outcome: Ongoing     Problem: Safety:  Goal: Free from accidental physical injury  Description: Free from accidental physical injury  Outcome: Ongoing  Goal: Free from intentional harm  Description: Free from intentional harm  Outcome: Ongoing     Problem: Daily Care:  Goal: Daily care needs are met  Description: Daily care needs are met  Outcome: Ongoing     Problem: Pain:  Goal: Patient's pain/discomfort is manageable  Description: Patient's pain/discomfort is manageable  Outcome: Ongoing  Goal: Pain level will decrease  Description: Pain level will decrease  Outcome: Ongoing  Goal: Control of acute pain  Description: Control of acute pain  Outcome: Ongoing  Goal: Control of chronic pain  Description: Control of chronic pain  Outcome: Ongoing     Problem: Discharge Planning:  Goal: Patients continuum of care needs are met  Description: Patients continuum of care needs are met  Outcome: Ongoing
Problem: Falls - Risk of:  Goal: Will remain free from falls  Description: Will remain free from falls  Outcome: Ongoing  Goal: Absence of physical injury  Description: Absence of physical injury  Outcome: Ongoing     Problem: OXYGENATION/RESPIRATORY FUNCTION  Goal: Patient will achieve/maintain normal respiratory rate/effort  Description: Respiratory rate and effort will be within normal limits for the patient  Outcome: Ongoing     Problem: SKIN INTEGRITY  Goal: Skin integrity is maintained or improved  Outcome: Ongoing     Problem: Infection:  Goal: Will remain free from infection  Description: Will remain free from infection  Outcome: Ongoing     Problem: Safety:  Goal: Free from accidental physical injury  Description: Free from accidental physical injury  Outcome: Ongoing  Goal: Free from intentional harm  Description: Free from intentional harm  Outcome: Ongoing     Problem: Daily Care:  Goal: Daily care needs are met  Description: Daily care needs are met  Outcome: Ongoing     Problem: Pain:  Goal: Patient's pain/discomfort is manageable  Description: Patient's pain/discomfort is manageable  Outcome: Ongoing  Goal: Pain level will decrease  Description: Pain level will decrease  Outcome: Ongoing  Goal: Control of acute pain  Description: Control of acute pain  Outcome: Ongoing  Goal: Control of chronic pain  Description: Control of chronic pain  Outcome: Ongoing     Problem: Discharge Planning:  Goal: Patients continuum of care needs are met  Description: Patients continuum of care needs are met  Outcome: Ongoing
Anthony Cooper RN  Outcome: Ongoing  Goal: Pain level will decrease  Description: Pain level will decrease  3/19/2020 1214 by Crystal Almonte RN  Outcome: Ongoing  3/19/2020 0237 by Anthony Cooper RN  Outcome: Ongoing  Goal: Control of acute pain  Description: Control of acute pain  3/19/2020 1214 by Crystal Almonte RN  Outcome: Ongoing  3/19/2020 0237 by Anthony Cooper RN  Outcome: Ongoing  Goal: Control of chronic pain  Description: Control of chronic pain  3/19/2020 1214 by Crystal Almonte RN  Outcome: Ongoing  3/19/2020 0237 by Anthony Cooper RN  Outcome: Ongoing     Problem: Discharge Planning:  Goal: Patients continuum of care needs are met  Description: Patients continuum of care needs are met  3/19/2020 1214 by Crystal Almonte RN  Outcome: Ongoing  3/19/2020 0237 by Anthony Cooper RN  Outcome: Ongoing

## 2020-03-22 NOTE — CARE COORDINATION
Spoke with patient regarding MD orders for [facility] services. Patient agreeable and has chosen [facility]. Referral Faxed. [address] [place] [address] [phone]. [phone]. Please notify [address] [place] [address] when patient discharges and fax DC Summary,  DC med list and any new [facility] orders. The Patient and/or patient representative Todd Wallace (Daughter) was provided with a choice of provider and agrees   with the discharge plan. [x] Yes [] No    Freedom of choice list was provided with basic dialogue that supports the patient's individualized plan of care/goals, treatment preferences and shares the quality data associated with the providers.  [x] Yes [] No  Electronically signed by Ismael Maxwell on 3/22/2020 at 11:02 AM

## 2020-03-22 NOTE — DISCHARGE SUMMARY
Hospital Discharge Summary    Jose Devries  :  1920  MRN:  [id number]    Admit date:  3/17/2020  Discharge date:  3-22-20    Admitting Physician:    Sona Davis MD    Discharge Diagnoses: Active Problems:    Respiratory failure ([address]  Resolved Problems:    * No resolved hospital problems. [facility] Course: The patient was admitted for the above noted medical/surgical issues. Please see daily progress note for further details concerning their stay. The patient improved throughout their stay and reached maximum medical improvement on the day of discharge. The patient/family agree with the treatment plan as outlined above. All questions concerning their stay were answered prior to discharge. They understand the importance of follow up concerning any abnormal test results. Discharge Medications:       Keyur Apple   Home Medication Instructions [id number]    Printed on:20 1053   Medication Information                      Acetaminophen (ARTHRITIS PAIN PO)  Take 1 tablet by mouth as needed              albuterol (PROVENTIL HFA;VENTOLIN HFA) 108 (90 BASE) MCG/ACT inhaler  Inhale 2 puffs into the lungs every 6 hours as needed. albuterol (PROVENTIL) (2.5 MG/3ML) 0.083% nebulizer solution  Take 2.5 mg by nebulization every 6 hours as needed.              amLODIPine (NORVASC) 5 MG tablet  Take 1 tablet by mouth 2 times daily             aspirin 81 MG tablet  Take 81 mg by mouth daily             busPIRone (BUSPAR) 15 MG tablet  Take 15 mg by mouth 2 times daily             carboxymethylcellulose 1 % ophthalmic solution  Place 1 drop into both eyes 4 times daily             carvedilol (COREG) 6.25 MG tablet  Take 1 tablet by mouth 2 times daily (with meals)             cefdinir (OMNICEF) 300 MG capsule  Take 1 capsule by mouth 2 times daily for 5 days             cloNIDine (CATAPRES) 0.1 MG tablet  Take 1 tablet by mouth nightly             Cyanocobalamin (B-12 PO)  Take 2,500 mcg by mouth daily              cyclobenzaprine (FLEXERIL) 5 MG tablet  Take 5 mg by mouth 3 times daily as needed for Muscle spasms             diclofenac sodium 1 % GEL  Apply 2 g topically 4 times daily              Esomeprazole Magnesium (NEXIUM PO)  Take 40 mg by mouth 2 times daily              FOLIC ACID PO  Take 055 mcg by mouth daily              furosemide (LASIX) 40 MG tablet  Take 1 tablet by mouth daily             guaiFENesin (MUCUS RELIEF) 400 MG tablet  Take 600 mg by mouth 2 times daily              hydrOXYzine (ATARAX) 10 MG tablet  Take 10 mg by mouth daily              lidocaine (XYLOCAINE) 5 % ointment  Apply topically as needed for Pain Apply topically as needed. lisinopril (PRINIVIL;ZESTRIL) 20 MG tablet  Take 1 tablet by mouth 2 times daily             Multiple Vitamins-Minerals (OCUVITE) TABS  Take 1 tablet by mouth 2 times daily              oxyCODONE-acetaminophen (PERCOCET)  MG per tablet  Take 1 tablet by mouth every 6 hours as needed for Pain. .             pantoprazole (PROTONIX) 40 MG tablet  Take 1 tablet by mouth 2 times daily (before meals)             Pentoxifylline (TRENTAL PO)  Take 400 mg by mouth 2 times daily              predniSONE (DELTASONE) 5 MG tablet  Take 5 mg by mouth daily as needed              senna (SENOKOT) 8.6 MG tablet  Take 2 tablets by mouth daily             Sucralfate (CARAFATE PO)  Take 1 g by mouth 3 times daily              vitamin D (CHOLECALCIFEROL) 5000 units CAPS capsule  Take 5,000 Units by mouth daily                 Consults: GI & Pulm     Significant Diagnostic Studies:    See summary of labs/x-rays/path report for further details      Treatments:   abx / steroids proton pump inhibitor/carafate    Home W H/H   Follow up with Emmanuelle Lindquist MD in 1 week. Signed:  Deonder Lao PA-C   3/22/2020, 10:58 AM     I have discussed the care of Denny Duff, including pertinent history and exam findings with the ARNP/PA.   I have seen

## 2020-03-22 NOTE — CARE COORDINATION
Called and spoke with Isabel, pts daughter, she stated pt lives with her and her spouse. Informed her that pt may be discharged today, she is ready to accept her back. They have all the equipment at home they need. Isabel does want [facility] services and chose [facility]. Will call and let them know. Monika spoke with  Trena Pearl. With Dr. Shania Cronin, he will enter order. Parul Hickey notified. Spoke with [employer].   Electronically signed by Basim Zhang RN on 3/22/2020 at 10:22 AM

## 2020-03-29 PROBLEM — J96.01 ACUTE RESPIRATORY FAILURE WITH HYPOXEMIA (HCC): Status: ACTIVE | Noted: 2020-01-01

## 2020-03-30 ENCOUNTER — OUTSIDE FACILITY SERVICE (OUTPATIENT)
Dept: PULMONOLOGY | Facility: CLINIC | Age: 85
End: 2020-03-30

## 2020-03-30 PROBLEM — J96.00 ACUTE RESPIRATORY FAILURE (HCC): Status: ACTIVE | Noted: 2020-01-01

## 2020-03-30 PROBLEM — J96.01 ACUTE RESPIRATORY FAILURE WITH HYPOXIA (HCC): Status: ACTIVE | Noted: 2020-01-01

## 2020-03-30 PROCEDURE — 99222 1ST HOSP IP/OBS MODERATE 55: CPT | Performed by: INTERNAL MEDICINE

## 2020-03-30 NOTE — PROGRESS NOTES
Holland Gentile arrived to room # 144. Presented with: hypoxia  Mental Status: Patient is oriented, alert and able to concentrate and follow conversation. Vitals:    03/29/20 2322   BP:    Pulse:    Resp: 18   Temp:    SpO2: 94%     Patient safety contract and falls prevention contract reviewed with patient Yes. Oriented Patient to room. Call light within reach. Yes.   Needs, issues or concerns expressed at this time: no.      Electronically signed by Erwin Javier RN on 3/30/2020 at 12:50 AM

## 2020-03-30 NOTE — PROGRESS NOTES
3/29/2020  9:09 PM - Timmy Holland Incoming Lab Results From [employer] Value Ref Range & Units Status Collected Lab   pH, Arterial 7.560High Panic   7.350 - 7.450 Final 03/29/2020  9:08 PM [facility]   pCO2, Arterial 39.0  35.0 - 45.0 mmHg Final 03/29/2020  9:08 PM [facility]   pO2, Arterial 52. 0Low   80.0 - 100.0 mmHg Final 03/29/2020  9:08 PM [facility]   HCO3, Arterial 34. 9High   22.0 - 26.0 mmol/L Final 03/29/2020  9:08 PM [facility] Excess, Arterial 11.7High   -2.0 - 2.0 mmol/L Final 03/29/2020  9:08 PM [facility]   Hemoglobin, Art, Extended 11.3Low   12.0 - 16.0 g/dL Final 03/29/2020  9:08  [facility]   O2 Sat, Arterial 89.6Low   >92 % Final 03/29/2020  9:08 PM [facility]   Carboxyhgb, Arterial 1.5  0.0 - 5.0 % Final 03/29/2020  9:08 PM [facility]        0.0-1.5   (Smokers 1.5-5.0)    Methemoglobin, Arterial 0.8  <1.5 % Final 03/29/2020  9:08 PM [facility]   O2 Content, Arterial 14.2  Not Established mL/dL Final 03/29/2020  9:08  [facility]     Pt on BiPAP 10/5 15 lpm Fi02  resp rate 25  Right radial puncture AT+

## 2020-03-30 NOTE — H&P
History and Physical      CHIEF COMPLAINT:  SOA    Reason for Admission:  Acute Resp Failure, Right Upper Lobe Infiltrate    History Obtained From:  patient    HISTORY OF PRESENT ILLNESS:      The patient is a 80 y.o. female who came to ER with 1 day of worsening SOA. She has had no worsening edema. No fever. She was recently hospitalized with CHF Exac., pleural effusion. No CP. She is currently on BIPAP. No abdominal pain. She has chronic pain/joint pain.      Past Medical History:        Diagnosis Date    Arthritis     CAD (coronary artery disease)     aortic stenosis    CHF (congestive heart failure) (HCC)     GERD (gastroesophageal reflux disease)     Hypertension     Osteoporosis     Palliative care patient 01/11/2019    Rheumatoid arthritis(714.0)     Swelling     bilateral lower extremity     Past Surgical History:        Procedure Laterality Date    BRAIN ANEURYSM SURGERY      COLECTOMY      EYE SURGERY      bilateral    OTHER SURGICAL HISTORY      eye injections per Dr. Terra Singh, [facility]    SPLENECTOMY      TOTAL KNEE ARTHROPLASTY      bilatera         Medications Prior to Admission:    Medications Prior to Admission: pantoprazole (PROTONIX) 40 MG tablet, Take 1 tablet by mouth 2 times daily (before meals)  amLODIPine (NORVASC) 5 MG tablet, Take 1 tablet by mouth 2 times daily  cloNIDine (CATAPRES) 0.1 MG tablet, Take 1 tablet by mouth nightly  carvedilol (COREG) 6.25 MG tablet, Take 1 tablet by mouth 2 times daily (with meals)  lisinopril (PRINIVIL;ZESTRIL) 20 MG tablet, Take 1 tablet by mouth 2 times daily (Patient taking differently: Take 20 mg by mouth daily )  furosemide (LASIX) 40 MG tablet, Take 1 tablet by mouth daily  cyclobenzaprine (FLEXERIL) 5 MG tablet, Take 5 mg by mouth 3 times daily as needed for Muscle spasms  aspirin 81 MG tablet, Take 81 mg by mouth daily  busPIRone (BUSPAR) 15 MG tablet, Take 15 mg by mouth 2 times daily  vitamin D (CHOLECALCIFEROL) 5000 units CAPS PHYSICAL EXAM:  Gen: NAD, alert, pleasant, on BIPAP  HEENT: WNL  Lymph: no LAD  Neck: no JVD or masses  Chest: coarse  CV: RRR  Abdomen: NT/ND  Extrem: no C/C/E  Neuro: non focal  Skin: no rashes  Joints: no redness  DATA:  I have reviewed the admission labs and imaging tests.     ASSESSMENT AND PLAN:      Active Problems:    Acute respiratory failure with hypoxemia---supportive care with BIPAP, add steroids, prn HFA, antibiotics   Right Upper Lobe Infiltrate---continue antibiotics, try and obtain sputum   Chronic Pain----supportive care   HTN--follow with BP   CAD---supportive care   GERD---resume meds        Merry Connolly MD  11:37 AM 3/30/2020

## 2020-03-30 NOTE — CARE COORDINATION
Patient is current with [facility] for SN/PT/HHA Services. Will follow. Please advise when patient discharges. [address] [facility] [phone]. [phone].     Yvon Castelan RN, Home Care Liaison  [phone] P  Electronically signed by Mick Isbell RN on 3/30/2020 at 9:40 AM

## 2020-03-30 NOTE — ED PROVIDER NOTES
[facility] EMERGENCY DEPT  EMERGENCY DEPARTMENT ENCOUNTER      Pt Name: Zeinab Ramírez  MRN: [id number]  [place] 12/23/1920  Date of evaluation: 3/29/2020  Provider: Dashawn Bell MD    [address] [place] [address]       Chief Complaint   Patient presents with    Respiratory Distress     per ems sat in 76s at home         HISTORY OF PRESENT ILLNESS   (Location/Symptom, Timing/Onset,Context/Setting, Quality, Duration, Modifying Factors, Severity)  Note limiting factors. Zeinab Ramírez is a 80 y.o. female who presents to the emergency department for evaluation of shortness of breath by EMS tonight. Patient was recently discharged from the hospital with hypoxic respiratory failure and was found to have large right-sided pleural effusion as well as CHF exacerbation. Patient was discharged after having improvement in clinical status but states shortness of breath worsened again today. EMS states patient's oxygen saturations were in the low 70s on their arrival and remained in the 70s despite nonrebreather. Patient was then placed on CPAP and oxygen saturations briefly improved to the 80s but then maintained in the upper 70s throughout most of the transport here. HPI    NursingNotes were reviewed. REVIEW OF SYSTEMS    (2-9 systems for level 4, 10 or more for level 5)     Review of Systems   Constitutional: Negative for fatigue and fever. HENT: Negative for congestion, rhinorrhea and voice change. Eyes: Negative for pain and redness. Respiratory: Positive for shortness of breath. Negative for cough. Cardiovascular: Negative for chest pain. Gastrointestinal: Negative for abdominal pain, diarrhea and vomiting. Endocrine: Negative. Genitourinary: Negative. Musculoskeletal: Negative for arthralgias and gait problem. Skin: Negative for rash and wound. Neurological: Negative for weakness and headaches. Hematological: Negative. Psychiatric/Behavioral: Negative.     All other systems reviewed and are times daily as needed for Itching     LISINOPRIL (PRINIVIL;ZESTRIL) 20 MG TABLET    Take 1 tablet by mouth 2 times daily    MULTIPLE VITAMINS-MINERALS (OCUVITE) TABS    Take 1 tablet by mouth 2 times daily     OXYCODONE-ACETAMINOPHEN (PERCOCET)  MG PER TABLET    Take 1 tablet by mouth every 6 hours as needed for Pain. Tobias Baumgarten PANTOPRAZOLE (PROTONIX) 40 MG TABLET    Take 1 tablet by mouth 2 times daily (before meals)    PENTOXIFYLLINE (TRENTAL PO)    Take 400 mg by mouth 2 times daily     PREDNISONE (DELTASONE) 5 MG TABLET    Take 5 mg by mouth daily as needed     SUCRALFATE (CARAFATE PO)    Take 1 g by mouth 4 times daily     VITAMIN D (CHOLECALCIFEROL) 5000 UNITS CAPS CAPSULE    Take 5,000 Units by mouth daily       ALLERGIES     Celecoxib; Erythromycin; Ofloxacin; Penicillins; Phenytoin sodium extended; and Sulfa antibiotics    FAMILY HISTORY       Family History   Problem Relation Age of Onset    Other Mother          from diverticulitis    Stroke Father     Cancer Other         siblings          SOCIAL HISTORY       Social History     Socioeconomic History    Marital status:       Spouse name: Not on file    Number of children: Not on file    Years of education: Not on file    Highest education level: Not on file   Occupational History    Not on file   Social Needs    Financial resource strain: Not on file    Food insecurity     Worry: Not on file     Inability: Not on file    Transportation needs     Medical: Not on file     Non-medical: Not on file   Tobacco Use    Smoking status: Former Smoker     Types: Cigarettes    Smokeless tobacco: Never Used   Substance and Sexual Activity    Alcohol use: No    Drug use: No    Sexual activity: Not on file   Lifestyle    Physical activity     Days per week: Not on file     Minutes per session: Not on file    Stress: Not on file   Relationships    Social connections     Talks on phone: Not on file     Gets together: Not on file     Attends here, with CPAP in place, patient's oxygen saturations were in the upper 70s with a good waveform. Patient was transitioned to BiPAP. Surprisingly, patient's exam was relatively unremarkable. She had no significant increase in work of breathing or respiratory distress on exam.  Work-up here showed possible development of a new infiltrate in the right upper lung zone. [CRAIG]   2242 Oxygen saturations remain 100% at this time on BiPAP. Laboratory work-up is overall unremarkable other than mild increase in white blood cell count, troponin, and BNP. [CRAIG]      ED Course User Index  [CRAIG] Soha Bustillo MD       Based on the evaluation and work-up here patient is felt to require further monitoring, work-up, or treatment that is available in the emergency department. Case was discussed with family medicine who agrees for observation or admission for further management. Treatment and stabilization as necessary were provided in the emergency department prior to transfer of care to the [facility] service. CONSULTS:  IP CONSULT TO PHARMACY    PROCEDURES:  Unless otherwise notedbelow, none     Procedures      FINAL IMPRESSION     1. Acute respiratory failure with hypoxia ([facility]    2. Pneumonia of right upper lobe due to infectious organism ([address]    3.  Chronic diastolic CHF (congestive heart failure) ([facility]          DISPOSITION/PLAN   DISPOSITION        PATIENT REFERRED TO:  Lilia Davison MD  [facility] [id number]  [phone]            DISCHARGE MEDICATIONS:  New Prescriptions    No medications on file          (Please note that portions of this note were completed with a voice recognition program.  Efforts were made to edit the dictations butoccasionally words are mis-transcribed.)    Soha Hawthorne MD (electronically signed)  AttendingEmerOzarks Community Hospitalcy Physician          Soha Bustillo MD  03/29/20 0562

## 2020-03-31 NOTE — CONSULTS
Pulmonary and Critical Care Consult Note  [address]    MR# [id number]    Acct# [de-identified]  3/30/2020   7:14 PM CDT    Referring Provider: Dr. Brittanie Martin  Chief Complaint: shortness of breath and hypoxia    HPI: We have been consulted to see this 80y.o. year old female born on 12/23/1920. Patient complains of severe and worsening shortness of breath in the mid chest for <5 days worsened over past day, aggravated by nothing and alleviated by rest and oxygen, and associated with no other symptoms. . She was recently in hospital with acute chf and pleural effusion. She has deteriorated at home, returned with profound hypoxia, was placed on BiPAP and admitted to icu. Past Medical History      Past Medical History:   Diagnosis Date    Arthritis     CAD (coronary artery disease)     aortic stenosis    CHF (congestive heart failure) (HCC)     GERD (gastroesophageal reflux disease)     Hypertension     Osteoporosis     Palliative care patient 01/11/2019    Rheumatoid arthritis(714.0)     Swelling     bilateral lower extremity     SurgicalHistory  Past Surgical History:   Procedure Laterality Date    BRAIN ANEURYSM SURGERY      COLECTOMY      EYE SURGERY      bilateral    OTHER SURGICAL HISTORY      eye injections per Dr. Ariella Snow, [facility]    SPLENECTOMY      TOTAL KNEE ARTHROPLASTY      bilatera     Allergies  Allergies   Allergen Reactions    Celecoxib     Erythromycin     Ofloxacin     Penicillins     Phenytoin Sodium Extended     Sulfa Antibiotics      Medications   reviewed in [facility]  Social History   reports that she has quit smoking. Her smoking use included cigarettes. She has never used smokeless tobacco. She reports that she does not drink alcohol or use drugs. Family History  family history includes Cancer in an other family member; Other in her mother; Stroke in her father.   Review of Systems:  Review of Systems   Unable to perform ROS: Severe respiratory 3/29/2020  EXAMINATION: XR CHEST PORTABLE 3/29/2020 9:21 PM HISTORY: Respiratory distress COMPARISON: 3/19/2020 FINDINGS: There appears to the volume loss on the right with persistent right middle and right lower lobe collapse, similar to the recent exam. There is associated tracheal deviation to the right. There are new airspace opacities in the right upper lobe when compared to the most recent exam. Left lung remains clear. There is no appreciable pneumothorax. The heart and mediastinal contours are stable. 1. Stable collapse of the right middle and right lower lobes. 2. Interval development of airspace opacities in the right upper lobe concerning for superimposed infectious or inflammatory process. Signed by Dr Yudelka Germain on 3/29/2020 9:23 PM    Xr Chest Portable    Result Date: 3/19/2020  XR CHEST PORTABLE 3/19/2020 5:00 AM HISTORY: Follow-up chest x-ray for right pleural effusion COMPARISON: 3/17/2020. FINDINGS: Frontal view of the chest was obtained. There is stable collapse of the right middle and lower lobes. The right upper lobe is clear. The left lung is clear. Scattered granulomatous calcifications are present. The cardiomediastinal silhouette and pulmonary vascularity are unchanged. Moderate curvature of the spine is seen. Severe degenerative changes and signs of chronic rotator cuff tears are noted in both shoulders. 1. Stable collapse of the right middle lobe and right lower lobe. 2. Severe degenerative changes in the shoulders. Signed by Dr Alka Vega on 3/19/2020 7:37 AM    Xr Chest Portable    Result Date: 3/17/2020  Exam:   XR CHEST PORTABLE  Date:  3/17/2020 History:  Female, age  80 years; respiratory distress. COMPARISON:  Chest x-ray dated 1/10/2019. Findings : The right heart border is obscured. Lower right lung zone opacity, reflecting effusion and probable lobar collapse of the right middle and lower lobes. There is no measurable pneumothorax. The bones show no acute pathology. Impression: Right pleural effusion, collapse of the right middle and lower lobes. Signed by Dr Leon Flower on 3/17/2020 10:03 AM    My radiograph interpretation/independent review of imaging: rul segmental infiltrates or atelectasis, RML, rll atelectasis  Other test results (not lab or imaging): echo 2018: The aortic valve is indeterminent for leaflet number, severely thickened &   calcified with severely reduced leaflet separation. Severe aortic stenosis; no evidence of aortic regurgitation. The aortic valve area is 0.7 cm2 with a maximum gradient of 61 mmHg and a   mean gradient of 35 mmHg. Mild tricuspid regurgitation with estimated RVSP of 38 mm Hg. Moderately dilated left atrium. Technically difficult examination. Left ventricular ejection fraction is visually estimated at 60%. Moderate concentric left ventricular hypertrophy. Normal left ventricular size and function. Independent review of ekg: none available this admission  Problem list generated by Signal:  Patient Active Problem List   Diagnosis    Shoulder pain, right    Primary localized osteoarthrosis of shoulder region    Shoulder pain, left    Nonrheumatic aortic valve stenosis    Palliative care patient    Acute on chronic diastolic heart failure ([address]    Hypertension    Moderate malnutrition ([address]    Respiratory failure (HCC)    Acute respiratory failure with hypoxemia (HCC)    Acute respiratory failure ([address]    Acute respiratory failure with hypoxia (HCC)     Pulmonary Assessment:  New problem (to me), with additional workup planned: acute respiratory failure with hypoxia  New problem (to me), no additional workup planned: aortic stenosis  Other problems either stable, failing to improve or worsenin. Acute and chronic diastolic chf, may be contributing to presenting problem  2. RML and RLL atelectasis  3.  Pulmonary hypertension, probably who group 2    Recommend/plan:   · BiPAP as needed, which seems to

## 2020-03-31 NOTE — TELEPHONE ENCOUNTER
Spoke to pt daughter  Recommended increase Nexium to bid as pt did not want EGD eval during office visit.  However, if pt changes her mind further discussion can be had at that time  
none

## 2020-04-04 PROBLEM — K21.9 GERD (GASTROESOPHAGEAL REFLUX DISEASE): Status: ACTIVE | Noted: 2020-01-01

## 2020-04-28 PROBLEM — Z51.5 HOSPICE CARE PATIENT: Status: ACTIVE | Noted: 2020-01-01

## 2020-07-08 ENCOUNTER — TELEPHONE (OUTPATIENT)
Dept: CARDIOLOGY | Age: 85
End: 2020-07-08

## 2025-06-20 NOTE — PROGRESS NOTES
Examine your lifestyle and the barriers to bad and good habits and how you can design your life to make better choices      Make it EASY to do the RIGHT THINGS.    1)  You can choose to Get 150 min/week of moderate exercise (can talk but can't sing) or 75 min/week of vigorous exercise (can't talk)   Examples: Walking, treadmill, bicycling, attending a gym.    2)  You can choose to Get 7-9 hours of sleep per night    Allow enough time each night to get adequate sleep.  Keep regular evening hours, same to bed and getting up every day.    3)  You can choose to Strength Train 2 x a week on non-consecutive days   Bodyweight exercises such push ups, sit ups, pilates or yoga   Purchase resistance bands to perform exercises   Utilize phone apps such as: [employer] or [employer]   Contact your local gym for a     4)  You can choose good nutrition.    Only eat your goal weight (in lbs) x 10 calories/day (Goal weight 150 lbs x 10 = 1500 calories per day)  Get 5 servings of Vegetables/day   Choose to eat a healthy diet such as the Mediterranean diet.    Plant based diets reduce risk of heart attack/stroke and will help you feel full on less food.    Avoid highly processed foods and processed carbohydrates.   Utilize apps to track your food:  Examples: Loseit, Myfitnesspal, or CalorieCounter by [employer]   Choose to follow a program.   Examples: Weight Watchers, Sara Hunt, WholeNoel or Nutrisystem   Utilize apps identify and improve eating habits:   Examples: Eat Right Now, Noom    5)  You can choose to drink less alcohol: Drink less than 1-2 drinks/day no more than 7 drinks per week.    Alcohol will disrupt your sleep and add calories to your day    6)  You can choose to Practice Mindfulness and work on stress reduction.    Utilize apps such as Calm, Headspace, Abide  Contact local behavioral health specialists      Small changes every day will add up       [facility]  Daily Progress Note  Stef Rao  MRN: [id number] LOS: 4    Admit Date: 3/17/2020   3/21/2020 10:23 AM    Subjective:          Chief Complaint:  Chief Complaint   Patient presents with    Respiratory Distress     arrived via EMS from home with SOA, resp difficulty, low sats       Interval History:    Reviewed overnight events and nursing notes. Status:  improved  Pain:  some relief        ROS:  10 point ROS obtained:   Gen: No fevers  HEENT: No migraine or visual change  Lung: No cough, hemopotysis or wheezing  Cv: No chest pain or pressure  Abd: No nausea or vomit, no change in bowel fct, no gi bleeding  Ext: No inc pain or swelling  Neuro: No seizure or syncope  Skin: No new rashes  Endo: No polyuria, polyphagia or poilydypsia  Psyc: No inc anxiety or depression  MS: No increase in joint pain or swelling reported    DIET:  DIET DYSPHAGIA MINCED AND MOIST; Medications:      busPIRone  15 mg Oral BID    sucralfate  1 g Oral 4x Daily AC & HS    carvedilol  6.25 mg Oral BID WC    furosemide  40 mg Oral Daily    aspirin  81 mg Oral Daily    methylPREDNISolone  40 mg Intravenous Q8H    carboxymethylcellulose  1 drop Both Eyes 4x Daily    pantoprazole  40 mg Oral BID AC    enoxaparin  30 mg Subcutaneous Q24H    azithromycin  500 mg Intravenous Q24H    cefTRIAXone (ROCEPHIN) IV  1 g Intravenous Q24H    ipratropium-albuterol  1 ampule Inhalation 4x daily       Data:     Code Status: DNR-CC    Family History   Problem Relation Age of Onset    Other Mother          from diverticulitis    Stroke Father     Cancer Other         siblings     Social History     Socioeconomic History    Marital status:       Spouse name: Not on file    Number of children: Not on file    Years of education: Not on file    Highest education level: Not on file   Occupational History    Not on file   Social Needs    Financial resource strain: Not on file    Food insecurity     Worry: Not on file     Inability: Not on file    Transportation needs     Medical: Not on file     Non-medical: Not on file   Tobacco Use    Smoking status: Former Smoker     Types: Cigarettes    Smokeless tobacco: Never Used   Substance and Sexual Activity    Alcohol use: No    Drug use: No    Sexual activity: Not on file   Lifestyle    Physical activity     Days per week: Not on file     Minutes per session: Not on file    Stress: Not on file   Relationships    Social connections     Talks on phone: Not on file     Gets together: Not on file     Attends Mosque service: Not on file     Active member of club or organization: Not on file     Attends meetings of clubs or organizations: Not on file     Relationship status: Not on file    Intimate partner violence     Fear of current or ex partner: Not on file     Emotionally abused: Not on file     Physically abused: Not on file     Forced sexual activity: Not on file   Other Topics Concern    Not on file   Social History Narrative    Not on file       Labs:  Hematology:No results for input(s): WBC, HGB, HCT, PLT, SEDRATE, CRP, INR in the last 72 hours. Invalid input(s): PT  Chemistry:  Recent Labs     03/19/20  0319 03/20/20  0247 03/21/20  0334    133* 137   K 4.2 4.0 3.8    96* 95*   CO2 21* 24 27   GLUCOSE 121* 118* 121*   BUN 32* 39* 41*   CREATININE 1.0* 1.0* 1.1*   ANIONGAP 18 13 15   LABGLOM 51* 51* 46*   CALCIUM 9.7* 8.8 8.9     No results for input(s): PROT, LABALBU, LABA1C, Q2IETEI, A5IANWC, FT4, TSH, AST, ALT, LDH, GGT, ALKPHOS, BILITOT, BILIDIR, AMMONIA, AMYLASE, LIPASE, LACTATE, CHOL, HDL, LDLCHOLESTEROL, CHOLHDLRATIO, TRIG, VLDL, PHENYTOIN, PHENYF in the last 72 hours.     Objective:     Vitals: /78   Pulse 73   Temp 98.5 °F (36.9 °C) (Temporal)   Resp 16   Ht 5' 7\" (1.702 m)   Wt 137 lb (62.1 kg)   SpO2 94%   BMI 21.46 kg/m²      Intake/Output Summary (Last 24 hours) at 3/21/2020 1023  Last data filed at 3/20/2020 1629  Gross per 24 hour   Intake 240 ml   Output 550 ml   Net -310 ml    Temp (24hrs), Av.2 °F (36.8 °C), Min:97.8 °F (36.6 °C), Max:98.5 °F (36.9 °C)    Glucose:  No results for input(s): POCGLU in the last 72 hours. Physical Examination:   General appearance - alert, well appearing, and in no distress and oriented to person, place, and time  Mental status - alert, oriented to person, place, and time, normal mood, behavior, speech, dress, motor activity, and thought processes  Eyes - pupils equal and reactive, extraocular eye movements intact  Ears - bilateral TM's and external ear canals normal, hearing grossly normal bilaterally  Mouth - mucous membranes moist, pharynx normal without lesions  Neck - supple, no significant adenopathy  Lymphatics - no palpable lymphadenopathy, no hepatosplenomegaly  Chest - clear to auscultation, no wheezes, rales or rhonchi, symmetric air entry, no tachypnea, retractions or cyanosis  Heart - normal rate, regular rhythm, normal S1, S2, no murmurs, rubs, clicks or gallops  Abdomen - soft, nontender, nondistended, no masses or organomegaly bowel sounds normal  Neurological - alert, oriented, normal speech, no focal findings or movement disorder noted  Musculoskeletal - no joint tenderness, deformity or swelling  Extremities - peripheral pulses normal, no pedal edema, no clubbing or cyanosis  Skin - normal coloration and turgor, no rashes, no suspicious skin lesions noted      Assessment and Plan:     Primary Problem:  ACUTE ON CHRONIC RESP. FAILURE  DIASTOLIC CHF  PAF  ADVANCED AGE    Hospital Problem list:  Active Problems:    Respiratory failure ([address]  Resolved Problems:    * No resolved hospital problems.  *      PMH:  Past Medical History:   Diagnosis Date    Arthritis     CAD (coronary artery disease)     aortic stenosis    CHF (congestive heart failure) (HCC)     GERD (gastroesophageal reflux disease)     Hypertension     Osteoporosis     Palliative care patient 2019   [facility]